# Patient Record
Sex: MALE | Race: WHITE | NOT HISPANIC OR LATINO | URBAN - METROPOLITAN AREA
[De-identification: names, ages, dates, MRNs, and addresses within clinical notes are randomized per-mention and may not be internally consistent; named-entity substitution may affect disease eponyms.]

---

## 2018-11-12 VITALS
RESPIRATION RATE: 15 BRPM | TEMPERATURE: 96.2 F | RESPIRATION RATE: 17 BRPM | SYSTOLIC BLOOD PRESSURE: 90 MMHG | SYSTOLIC BLOOD PRESSURE: 92 MMHG | HEART RATE: 76 BPM | HEART RATE: 66 BPM | DIASTOLIC BLOOD PRESSURE: 58 MMHG | SYSTOLIC BLOOD PRESSURE: 96 MMHG | HEART RATE: 52 BPM | OXYGEN SATURATION: 99 % | OXYGEN SATURATION: 100 % | DIASTOLIC BLOOD PRESSURE: 55 MMHG | RESPIRATION RATE: 18 BRPM | DIASTOLIC BLOOD PRESSURE: 70 MMHG | SYSTOLIC BLOOD PRESSURE: 121 MMHG | HEART RATE: 67 BPM | HEART RATE: 65 BPM | SYSTOLIC BLOOD PRESSURE: 119 MMHG | HEART RATE: 63 BPM | SYSTOLIC BLOOD PRESSURE: 93 MMHG | SYSTOLIC BLOOD PRESSURE: 110 MMHG | HEART RATE: 69 BPM | HEART RATE: 56 BPM | HEART RATE: 58 BPM | RESPIRATION RATE: 19 BRPM | TEMPERATURE: 96.9 F | DIASTOLIC BLOOD PRESSURE: 59 MMHG | HEART RATE: 74 BPM | DIASTOLIC BLOOD PRESSURE: 56 MMHG | DIASTOLIC BLOOD PRESSURE: 67 MMHG | SYSTOLIC BLOOD PRESSURE: 113 MMHG | DIASTOLIC BLOOD PRESSURE: 61 MMHG | SYSTOLIC BLOOD PRESSURE: 91 MMHG | WEIGHT: 160 LBS | DIASTOLIC BLOOD PRESSURE: 62 MMHG | OXYGEN SATURATION: 98 % | DIASTOLIC BLOOD PRESSURE: 57 MMHG | RESPIRATION RATE: 14 BRPM | DIASTOLIC BLOOD PRESSURE: 66 MMHG | RESPIRATION RATE: 16 BRPM | HEIGHT: 70 IN | SYSTOLIC BLOOD PRESSURE: 85 MMHG | HEART RATE: 70 BPM | HEART RATE: 73 BPM

## 2018-11-13 ENCOUNTER — AMBULATORY SURGICAL CENTER (AMBULATORY)
Dept: URBAN - METROPOLITAN AREA SURGERY 17 | Facility: SURGERY | Age: 68
End: 2018-11-13
Payer: MEDICARE

## 2018-11-13 DIAGNOSIS — K64.8 OTHER HEMORRHOIDS: ICD-10-CM

## 2018-11-13 DIAGNOSIS — Z86.010 PERSONAL HISTORY OF COLONIC POLYPS: ICD-10-CM

## 2018-11-13 DIAGNOSIS — K57.30 DIVERTICULOSIS OF LARGE INTESTINE WITHOUT PERFORATION OR ABS: ICD-10-CM

## 2018-11-13 PROCEDURE — G0105 COLORECTAL SCRN; HI RISK IND: HCPCS | Performed by: INTERNAL MEDICINE

## 2020-10-22 PROBLEM — Z00.00 WELLNESS EXAMINATION: Status: ACTIVE | Noted: 2020-10-22

## 2020-10-22 PROBLEM — N52.8 OTHER MALE ERECTILE DYSFUNCTION: Status: ACTIVE | Noted: 2020-10-22

## 2020-11-10 ENCOUNTER — OFFICE VISIT (OUTPATIENT)
Dept: INTERNAL MEDICINE | Facility: CLINIC | Age: 70
End: 2020-11-10

## 2020-11-10 VITALS
DIASTOLIC BLOOD PRESSURE: 78 MMHG | BODY MASS INDEX: 23.34 KG/M2 | SYSTOLIC BLOOD PRESSURE: 122 MMHG | HEIGHT: 70 IN | WEIGHT: 163 LBS

## 2020-11-10 DIAGNOSIS — E55.9 VITAMIN D DEFICIENCY: ICD-10-CM

## 2020-11-10 DIAGNOSIS — N52.8 OTHER MALE ERECTILE DYSFUNCTION: ICD-10-CM

## 2020-11-10 DIAGNOSIS — Z00.00 WELLNESS EXAMINATION: Primary | ICD-10-CM

## 2020-11-10 PROCEDURE — G0439 PPPS, SUBSEQ VISIT: HCPCS | Performed by: INTERNAL MEDICINE

## 2020-11-10 RX ORDER — TADALAFIL 5 MG/1
TABLET ORAL
COMMUNITY
Start: 2015-09-22 | End: 2020-11-10 | Stop reason: SDUPTHER

## 2020-11-10 RX ORDER — ASPIRIN 81 MG/1
TABLET, CHEWABLE ORAL
COMMUNITY
Start: 2013-07-24 | End: 2020-12-11 | Stop reason: SDUPTHER

## 2020-11-10 RX ORDER — TADALAFIL 20 MG/1
20 TABLET ORAL DAILY PRN
Qty: 14 TABLET | Refills: 11 | Status: SHIPPED | OUTPATIENT
Start: 2020-11-10 | End: 2020-12-11 | Stop reason: SDUPTHER

## 2020-11-10 RX ORDER — MAGNESIUM CARB/ALUMINUM HYDROX 105-160MG
TABLET,CHEWABLE ORAL
COMMUNITY
Start: 2013-07-24

## 2020-11-19 ENCOUNTER — TELEPHONE (OUTPATIENT)
Dept: INTERNAL MEDICINE | Facility: CLINIC | Age: 70
End: 2020-11-19

## 2020-11-19 NOTE — TELEPHONE ENCOUNTER
PTS WIFE ROMULO IS CALLING IN STATING THAT PTS LYMPH NODES HAVE SWOLLEN REALLY BAD.  ROMULO SAYS THIS HAS BEEN GOING ON FOR 5 DAYS NOW.  PT IS NOT IN ANY PAIN WITH IT AT ALL BUT ROMULO SAID IT HAS GOTTEN BIGGER AND SHE WANTS TO KNOW WHAT DR VASQUEZ SUGGEST HE DO    PT CALL BACK  [phone]  PHARMACY  [facility]  [address]

## 2020-12-11 ENCOUNTER — OFFICE VISIT (OUTPATIENT)
Dept: INTERNAL MEDICINE | Facility: CLINIC | Age: 70
End: 2020-12-11

## 2020-12-11 VITALS
HEIGHT: 70 IN | WEIGHT: 162 LBS | DIASTOLIC BLOOD PRESSURE: 60 MMHG | HEART RATE: 56 BPM | OXYGEN SATURATION: 96 % | SYSTOLIC BLOOD PRESSURE: 110 MMHG | TEMPERATURE: 97.8 F | BODY MASS INDEX: 23.19 KG/M2

## 2020-12-11 DIAGNOSIS — R22.1 NECK MASS: Primary | ICD-10-CM

## 2020-12-11 DIAGNOSIS — K11.8 SUBMANDIBULAR GLAND MASS: ICD-10-CM

## 2020-12-11 PROCEDURE — 99213 OFFICE O/P EST LOW 20 MIN: CPT | Performed by: INTERNAL MEDICINE

## 2020-12-11 RX ORDER — TADALAFIL 20 MG/1
20 TABLET ORAL
COMMUNITY
Start: 2020-11-10 | End: 2021-07-09 | Stop reason: ALTCHOICE

## 2020-12-11 NOTE — PROGRESS NOTES
Subjective   Matthew Serrano is a 70 y.o. male.     Chief Complaint   Patient presents with   • Swollen Glands     neck area x 1 month       History of Present Illness   This is a 70-year-old gentleman with a past medical history significant for erectile dysfunction who presents for an acute visit.  The patient presents with complaints of a swollen gland in his neck which is been present for approximately 1 month.  He was previously seen at the [facility] where he was treated with amoxicillin.  He states that the lesion is nontender and has not created any difficulties with swallowing or breathing.  He has not had any fevers, no chills, no night sweats, no weight loss or weight changes.  No other new voiced complaints.    The following portions of the patient's history were reviewed and updated as appropriate: allergies, current medications, past family history, past medical history, past social history, past surgical history and problem list.    Depression Screen:  PHQ-2/PHQ-9 Depression Screening 11/10/2020   Little interest or pleasure in doing things 0   Feeling down, depressed, or hopeless 0   Total Score 0       Assessment/Plan   Diagnoses and all orders for this visit:    1. Neck mass (Primary)  -     CT Soft Tissue Neck With Contrast; Future    2. Submandibular gland mass         #1.  Submandibular gland mass-this could be an enlarged lymph gland, it is nontender on examination, no response to treatment with antibiotics, no other associated symptoms, concern for a primary cancer or lymphoma, would recommend obtaining a CT of the soft tissues of the neck for further evaluation.    History reviewed. No pertinent past medical history.    History reviewed. No pertinent surgical history.    History reviewed. No pertinent family history.    Social History     Socioeconomic History   • Marital status:      Spouse name: Not on file   • Number of children: Not on file   • Years of education: Not on  "file   • Highest education level: Not on file   Tobacco Use   • Smoking status: Never Smoker   • Smokeless tobacco: Never Used       Current Outpatient Medications   Medication Sig Dispense Refill   • Glucosamine-Chondroitin 750-600 MG tablet Take  by mouth.     • Multiple Vitamins-Minerals (MULTI COMPLETE PO) Take  by mouth.     • tadalafil (CIALIS) 20 MG tablet Take 20 mg by mouth.       No current facility-administered medications for this visit.        Review of Systems   HENT: Positive for swollen glands.    Neurological: Positive for memory problem.   Psychiatric/Behavioral: Positive for sleep disturbance.   All other systems reviewed and are negative.      Objective   /60   Pulse 56   Temp 97.8 °F (36.6 °C)   Ht 177.8 cm (70\")   Wt 73.5 kg (162 lb)   SpO2 96%   BMI 23.24 kg/m²     Physical Exam  Constitutional:  Patient appears well-developed, well-nourished and in no acute distress.  Neck:  The neck is supple and symmetric.  The trachea is midline with no masses.  Exam of the thyroid reveals no thyromegaly or masses, patient does have a palpable mass just beneath the right mandible which is firm and nontender to palpation  Pulmonary:  Respiratory effort is normal with no increased work of breathing or respiratory distress.  Lungs are clear to auscultation bilaterally.  Cardiovascular:  Auscultation of the heart rate and rhythm is normal.  S1 and S2 are normal without rubs, gallops or murmurs.  Carotid pulses are 2+ bilaterally without bruit.  Examination of the extremities reveals no edema.  Neurologic:  Cranial nerves II-XII are grossly intact.  Psychiatric:  Patient's judgment and insight are unimpaired.  Patient's mood and affect are normal.      Recent Results (from the past 2016 hour(s))   CBC & Differential    Collection Time: 11/03/20  8:59 AM    Specimen: Blood   Result Value Ref Range    WBC 4.8 3.4 - 10.8 x10E3/uL    RBC 4.60 4.14 - 5.80 x10E6/uL    Hemoglobin 15.3 13.0 - 17.7 g/dL    " Hematocrit 44.7 37.5 - 51.0 %    MCV 97 79 - 97 fL    MCH 33.3 (H) 26.6 - 33.0 pg    MCHC 34.2 31.5 - 35.7 g/dL    RDW 11.9 11.6 - 15.4 %    Platelets 222 150 - 450 x10E3/uL    Neutrophil Rel % 60 Not Estab. %    Lymphocyte Rel % 29 Not Estab. %    Monocyte Rel % 8 Not Estab. %    Eosinophil Rel % 2 Not Estab. %    Basophil Rel % 1 Not Estab. %    Neutrophils Absolute 2.9 1.4 - 7.0 x10E3/uL    Lymphocytes Absolute 1.4 0.7 - 3.1 x10E3/uL    Monocytes Absolute 0.4 0.1 - 0.9 x10E3/uL    Eosinophils Absolute 0.1 0.0 - 0.4 x10E3/uL    Basophils Absolute 0.0 0.0 - 0.2 x10E3/uL    Immature Granulocyte Rel % 0 Not Estab. %    Immature Grans Absolute 0.0 0.0 - 0.1 x10E3/uL   Comprehensive metabolic panel    Collection Time: 11/03/20  8:59 AM    Specimen: Blood   Result Value Ref Range    Glucose 91 65 - 99 mg/dL    BUN 15 8 - 27 mg/dL    Creatinine 0.88 0.76 - 1.27 mg/dL    eGFR Non African Am 88 >59 mL/min/1.73    eGFR African Am 102 >59 mL/min/1.73    BUN/Creatinine Ratio 17 10 - 24    Sodium 137 134 - 144 mmol/L    Potassium 5.0 3.5 - 5.2 mmol/L    Chloride 102 96 - 106 mmol/L    Total CO2 25 20 - 29 mmol/L    Calcium 9.4 8.6 - 10.2 mg/dL    Total Protein 6.5 6.0 - 8.5 g/dL    Albumin 4.1 3.8 - 4.8 g/dL    Globulin 2.4 1.5 - 4.5 g/dL    A/G Ratio 1.7 1.2 - 2.2    Total Bilirubin 0.7 0.0 - 1.2 mg/dL    Alkaline Phosphatase 55 39 - 117 IU/L    AST (SGOT) 23 0 - 40 IU/L    ALT (SGPT) 20 0 - 44 IU/L   Lipid Panel With LDL / HDL Ratio    Collection Time: 11/03/20  8:59 AM    Specimen: Blood   Result Value Ref Range    Total Cholesterol 157 100 - 199 mg/dL    Triglycerides 83 0 - 149 mg/dL    HDL Cholesterol 58 >39 mg/dL    VLDL Cholesterol Yoel 16 5 - 40 mg/dL    LDL Chol Calc ([facility]) 83 0 - 99 mg/dL    LDL/HDL RATIO 1.4 0.0 - 3.6 ratio   Vitamin D 25 hydroxy    Collection Time: 11/03/20  8:59 AM    Specimen: Blood   Result Value Ref Range    25 Hydroxy, Vitamin D 49.2 30.0 - 100.0 ng/mL

## 2020-12-21 ENCOUNTER — HOSPITAL ENCOUNTER (OUTPATIENT)
Dept: CT IMAGING | Facility: HOSPITAL | Age: 70
Discharge: HOME | End: 2020-12-21
Admitting: NURSE PRACTITIONER

## 2020-12-21 DIAGNOSIS — R22.1 NECK MASS: ICD-10-CM

## 2020-12-21 LAB — CREAT BLDA-MCNC: 1 MG/DL (ref 0.6–1.3)

## 2020-12-21 PROCEDURE — 82565 ASSAY OF CREATININE: CPT

## 2020-12-21 PROCEDURE — 25010000002 IOPAMIDOL 61 % SOLUTION: Performed by: NURSE PRACTITIONER

## 2020-12-21 PROCEDURE — 70491 CT SOFT TISSUE NECK W/DYE: CPT

## 2020-12-21 RX ADMIN — IOPAMIDOL 85 ML: 612 INJECTION, SOLUTION INTRAVENOUS at 20:00

## 2020-12-22 PROCEDURE — 25010000002 IOPAMIDOL 61 % SOLUTION: Performed by: NURSE PRACTITIONER

## 2020-12-23 ENCOUNTER — OFFICE VISIT (OUTPATIENT)
Dept: FAMILY MEDICINE CLINIC | Facility: CLINIC | Age: 70
End: 2020-12-23

## 2020-12-23 VITALS
SYSTOLIC BLOOD PRESSURE: 110 MMHG | BODY MASS INDEX: 22.94 KG/M2 | HEART RATE: 48 BPM | OXYGEN SATURATION: 98 % | DIASTOLIC BLOOD PRESSURE: 54 MMHG | RESPIRATION RATE: 16 BRPM | TEMPERATURE: 97.1 F | WEIGHT: 160.2 LBS | HEIGHT: 70 IN

## 2020-12-23 DIAGNOSIS — R60.9 SUBMANDIBULAR GLAND SWELLING: Primary | ICD-10-CM

## 2020-12-23 DIAGNOSIS — Z82.49 FAMILY HISTORY OF HEART VALVE ABNORMALITY: ICD-10-CM

## 2020-12-23 PROCEDURE — 99214 OFFICE O/P EST MOD 30 MIN: CPT | Performed by: INTERNAL MEDICINE
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
## 2021-01-21 ENCOUNTER — OFFICE VISIT (OUTPATIENT)
Dept: CARDIOLOGY | Facility: CLINIC | Age: 71
End: 2021-01-21

## 2021-01-21 VITALS
SYSTOLIC BLOOD PRESSURE: 110 MMHG | BODY MASS INDEX: 23.39 KG/M2 | HEART RATE: 51 BPM | HEIGHT: 70 IN | DIASTOLIC BLOOD PRESSURE: 64 MMHG | WEIGHT: 163.4 LBS

## 2021-01-21 DIAGNOSIS — Z82.49 FAMILY HISTORY OF VALVULAR HEART DISEASE: ICD-10-CM

## 2021-01-21 DIAGNOSIS — R00.1 BRADYCARDIA, SINUS: ICD-10-CM

## 2021-01-21 DIAGNOSIS — Z82.49 FAMILY HISTORY OF CORONARY ARTERY DISEASE: ICD-10-CM

## 2021-01-21 DIAGNOSIS — R06.02 SHORTNESS OF BREATH: Primary | ICD-10-CM

## 2021-01-21 PROCEDURE — 99204 OFFICE O/P NEW MOD 45 MIN: CPT | Performed by: INTERNAL MEDICINE

## 2021-01-21 PROCEDURE — 93000 ELECTROCARDIOGRAM COMPLETE: CPT | Performed by: INTERNAL MEDICINE

## 2021-01-21 NOTE — PROGRESS NOTES
Date of Office Visit: 2021  Encounter Provider: Malik Tena MD  Place of Service: [facility] CARDIOLOGY  Patient Name: Matthew Serrano  :1950    Chief complaint: Family history of valvular heart disease and coronary artery disease.    History of Present Illness:    I had the pleasure of seeing the patient in cardiology office on 2021.  He is a very pleasant 70 year-old fairly healthy male who presents for evaluation.  The patient is relatively healthy.  He is active, and walks 5 miles most days.  He has some mild dyspnea on exertion, but no chest discomfort.  He has also had longstanding sinus bradycardia in the 50s, although he is completely asymptomatic from this as well.  However, he does have a significant family history of cardiac disease.  Specifically, his mother is  from a stroke following a heart valve surgery.  He also has 2 brothers with cardiac issues.  One of his brothers had a valve replaced in his early 60s.  His other brother had lymphoma, and did receive radiation therapy to his chest.  However, he ultimately ended up with 2 valve replacements, repaired valve, and a three-vessel CABG at age 54.  His father also had a history of congestive heart failure in his 80s.  He is mainly here to be checked as he has not had any specific cardiac testing other than an EKG in the past.      Past Medical History:   Diagnosis Date   • Erectile dysfunction    • Family history of heart valve abnormality    • Sinus bradycardia        History reviewed. No pertinent surgical history.    Current Outpatient Medications on File Prior to Visit   Medication Sig Dispense Refill   • Glucosamine-Chondroitin 750-600 MG tablet Take  by mouth.     • Multiple Vitamins-Minerals (MULTI COMPLETE PO) Take  by mouth.     • tadalafil (CIALIS) 20 MG tablet Take 20 mg by mouth.       No current facility-administered medications on file prior to visit.      Allergies as of  "2021   • (No Known Allergies)     Social History     Socioeconomic History   • Marital status:      Spouse name: Not on file   • Number of children: Not on file   • Years of education: Not on file   • Highest education level: Not on file   Tobacco Use   • Smoking status: Never Smoker   • Smokeless tobacco: Never Used   Substance and Sexual Activity   • Alcohol use: Yes     Comment: nightly   • Drug use: Never   Social History Narrative    ;  No kids.  Has a step daughter.  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       Family History   Problem Relation Age of Onset   • Stroke Mother    • Heart valve disorder Mother         Mother  from a stroke after a heart valve surgery   • Heart disease Father    • Heart valve disorder Maternal Aunt    • Lymphoma Brother    • Heart valve disorder Brother         Status post replacement of 2 valves, repair of 1 valve, and 3 vessel CABG at 54   • Heart valve disorder Brother         Valve replacement surgery in his early 60s       Review of Systems   Respiratory: Positive for shortness of breath.    All other systems reviewed and are negative.     Objective:     Vitals:    21 0908   BP: 110/64   Pulse: 51   Weight: 74.1 kg (163 lb 6.4 oz)   Height: 177.8 cm (70\")     Body mass index is 23.45 kg/m².    Constitutional:       Appearance: Healthy appearance. Well-developed.   Eyes:      Conjunctiva/sclera: Conjunctivae normal.   HENT:      Head: Normocephalic and atraumatic.   Neck:      Musculoskeletal: Neck supple.   Pulmonary:      Effort: Pulmonary effort is normal.      Breath sounds: Normal breath sounds.   Cardiovascular:      Bradycardia present. Regular rhythm.      No gallop.   Edema:     Peripheral edema absent.   Abdominal:      Palpations: Abdomen is soft.      Tenderness: There is no abdominal tenderness.   Skin:     General: Skin is warm.   Neurological:      Mental Status: Alert and oriented to " person, place, and time.   Psychiatric:         Behavior: Behavior normal.       Lab Review:     ECG 12 Lead    Date/Time: 1/21/2021 9:17 AM  Performed by: Malik Tena MD  Authorized by: Malik Tena MD   Comparison: compared with previous ECG from 9/22/2015  Similar to previous ECG  Rhythm: sinus bradycardia  Rate: bradycardic  BPM: 51    Clinical impression: normal ECG            Lipid Panel    Lipid Panel 11/3/20   Total Cholesterol 157   Triglycerides 83   HDL Cholesterol 58   VLDL Cholesterol 16   LDL Cholesterol  83   LDL/HDL Ratio 1.4      Comments are available for some flowsheets but are not being displayed.                Assessment:       Diagnosis Plan   1. Shortness of breath  Adult Transthoracic Echo Complete w/ Color, Spectral and Contrast if Necessary Per Protocol    Treadmill Stress Test   2. Family history of valvular heart disease  Adult Transthoracic Echo Complete w/ Color, Spectral and Contrast if Necessary Per Protocol   3. Family history of coronary artery disease  Adult Transthoracic Echo Complete w/ Color, Spectral and Contrast if Necessary Per Protocol    Treadmill Stress Test   4. Bradycardia, sinus  Adult Transthoracic Echo Complete w/ Color, Spectral and Contrast if Necessary Per Protocol     Plan:       I did not hear a murmur on exam, although his family history of valvular heart disease is extensive as detailed above.  He is not sure of the specific type of valve disease, although a bicuspid aortic valve or mitral valve prolapse would be the most common things.  I have recommended an echocardiogram at this point to definitively assess his cardiac valves.  His brother also had a three-vessel CABG, although it was unclear as to whether this was related to radiation-induced heart disease.  I have also ordered an exercise treadmill stress test.  Further plans will be made pending the results of the above tests.

## 2021-03-08 ENCOUNTER — HOSPITAL ENCOUNTER (OUTPATIENT)
Dept: CARDIOLOGY | Facility: HOSPITAL | Age: 71
Discharge: HOME | End: 2021-03-08

## 2021-03-08 VITALS
WEIGHT: 163 LBS | SYSTOLIC BLOOD PRESSURE: 123 MMHG | HEART RATE: 49 BPM | HEIGHT: 70 IN | DIASTOLIC BLOOD PRESSURE: 78 MMHG | BODY MASS INDEX: 23.34 KG/M2

## 2021-03-08 DIAGNOSIS — Z82.49 FAMILY HISTORY OF CORONARY ARTERY DISEASE: ICD-10-CM

## 2021-03-08 DIAGNOSIS — R00.1 BRADYCARDIA, SINUS: ICD-10-CM

## 2021-03-08 DIAGNOSIS — Z82.49 FAMILY HISTORY OF VALVULAR HEART DISEASE: ICD-10-CM

## 2021-03-08 DIAGNOSIS — R06.02 SHORTNESS OF BREATH: ICD-10-CM

## 2021-03-08 LAB
BH CV STRESS BP STAGE 1: NORMAL
BH CV STRESS BP STAGE 2: NORMAL
BH CV STRESS BP STAGE 3: NORMAL
BH CV STRESS DURATION MIN STAGE 1: 3
BH CV STRESS DURATION MIN STAGE 2: 3
BH CV STRESS DURATION MIN STAGE 3: 3
BH CV STRESS DURATION SEC STAGE 1: 0
BH CV STRESS DURATION SEC STAGE 2: 0
BH CV STRESS DURATION SEC STAGE 3: 0
BH CV STRESS GRADE STAGE 1: 10
BH CV STRESS GRADE STAGE 2: 12
BH CV STRESS GRADE STAGE 3: 14
BH CV STRESS HR STAGE 1: 88
BH CV STRESS HR STAGE 2: 113
BH CV STRESS HR STAGE 3: 140
BH CV STRESS METS STAGE 1: 5
BH CV STRESS METS STAGE 2: 7.5
BH CV STRESS METS STAGE 3: 10
BH CV STRESS PROTOCOL 1: NORMAL
BH CV STRESS RECOVERY BP: NORMAL MMHG
BH CV STRESS RECOVERY HR: 70 BPM
BH CV STRESS SPEED STAGE 1: 1.7
BH CV STRESS SPEED STAGE 2: 2.5
BH CV STRESS SPEED STAGE 3: 3.4
BH CV STRESS STAGE 1: 1
BH CV STRESS STAGE 2: 2
BH CV STRESS STAGE 3: 3
MAXIMAL PREDICTED HEART RATE: 152 BPM
PERCENT MAX PREDICTED HR: 92.11 %
STRESS BASELINE BP: NORMAL MMHG
STRESS BASELINE HR: 51 BPM
STRESS PERCENT HR: 108 %
STRESS POST ESTIMATED WORKLOAD: 10 METS
STRESS POST EXERCISE DUR MIN: 9 MIN
STRESS POST EXERCISE DUR SEC: 0 SEC
STRESS POST PEAK BP: NORMAL MMHG
STRESS POST PEAK HR: 140 BPM
STRESS TARGET HR: 129 BPM

## 2021-03-08 PROCEDURE — 93018 CV STRESS TEST I&R ONLY: CPT | Performed by: INTERNAL MEDICINE

## 2021-03-08 PROCEDURE — 93306 TTE W/DOPPLER COMPLETE: CPT

## 2021-03-08 PROCEDURE — 93306 TTE W/DOPPLER COMPLETE: CPT | Performed by: INTERNAL MEDICINE

## 2021-03-08 PROCEDURE — 93016 CV STRESS TEST SUPVJ ONLY: CPT | Performed by: INTERNAL MEDICINE

## 2021-03-08 PROCEDURE — 93017 CV STRESS TEST TRACING ONLY: CPT

## 2021-03-09 LAB
AORTIC ARCH: 2.2 CM
ASCENDING AORTA: 3.3 CM
BH CV ECHO MEAS - ACS: 2.2 CM
BH CV ECHO MEAS - AI DEC SLOPE: 70.5 CM/SEC^2
BH CV ECHO MEAS - AI MAX PG: 15.8 MMHG
BH CV ECHO MEAS - AI MAX VEL: 199 CM/SEC
BH CV ECHO MEAS - AI P1/2T: 826.7 MSEC
BH CV ECHO MEAS - AO MAX PG (FULL): 0.99 MMHG
BH CV ECHO MEAS - AO MAX PG: 4 MMHG
BH CV ECHO MEAS - AO MEAN PG (FULL): 1 MMHG
BH CV ECHO MEAS - AO MEAN PG: 2 MMHG
BH CV ECHO MEAS - AO ROOT AREA (BSA CORRECTED): 1.8
BH CV ECHO MEAS - AO ROOT AREA: 9.1 CM^2
BH CV ECHO MEAS - AO ROOT DIAM: 3.4 CM
BH CV ECHO MEAS - AO V2 MAX: 100 CM/SEC
BH CV ECHO MEAS - AO V2 MEAN: 71.8 CM/SEC
BH CV ECHO MEAS - AO V2 VTI: 26.9 CM
BH CV ECHO MEAS - ASC AORTA: 3.3 CM
BH CV ECHO MEAS - AVA(I,A): 2.5 CM^2
BH CV ECHO MEAS - AVA(I,D): 2.5 CM^2
BH CV ECHO MEAS - AVA(V,A): 3 CM^2
BH CV ECHO MEAS - AVA(V,D): 3 CM^2
BH CV ECHO MEAS - BSA(HAYCOCK): 1.9 M^2
BH CV ECHO MEAS - BSA: 1.9 M^2
BH CV ECHO MEAS - BZI_BMI: 23.4 KILOGRAMS/M^2
BH CV ECHO MEAS - BZI_METRIC_HEIGHT: 177.8 CM
BH CV ECHO MEAS - BZI_METRIC_WEIGHT: 73.9 KG
BH CV ECHO MEAS - EDV(MOD-SP2): 58 ML
BH CV ECHO MEAS - EDV(MOD-SP4): 73 ML
BH CV ECHO MEAS - EDV(TEICH): 78.6 ML
BH CV ECHO MEAS - EF(CUBED): 85.6 %
BH CV ECHO MEAS - EF(MOD-BP): 68.6 %
BH CV ECHO MEAS - EF(MOD-SP2): 67.2 %
BH CV ECHO MEAS - EF(MOD-SP4): 69.9 %
BH CV ECHO MEAS - EF(TEICH): 79.4 %
BH CV ECHO MEAS - ESV(MOD-SP2): 19 ML
BH CV ECHO MEAS - ESV(MOD-SP4): 22 ML
BH CV ECHO MEAS - ESV(TEICH): 16.2 ML
BH CV ECHO MEAS - FS: 47.6 %
BH CV ECHO MEAS - IVS/LVPW: 1.2
BH CV ECHO MEAS - IVSD: 1.1 CM
BH CV ECHO MEAS - LAT PEAK E' VEL: 11 CM/SEC
BH CV ECHO MEAS - LV DIASTOLIC VOL/BSA (35-75): 38.1 ML/M^2
BH CV ECHO MEAS - LV MASS(C)D: 137.2 GRAMS
BH CV ECHO MEAS - LV MASS(C)DI: 71.7 GRAMS/M^2
BH CV ECHO MEAS - LV MAX PG: 3 MMHG
BH CV ECHO MEAS - LV MEAN PG: 1 MMHG
BH CV ECHO MEAS - LV SYSTOLIC VOL/BSA (12-30): 11.5 ML/M^2
BH CV ECHO MEAS - LV V1 MAX: 86.8 CM/SEC
BH CV ECHO MEAS - LV V1 MEAN: 56.2 CM/SEC
BH CV ECHO MEAS - LV V1 VTI: 19.7 CM
BH CV ECHO MEAS - LVIDD: 4.2 CM
BH CV ECHO MEAS - LVIDS: 2.2 CM
BH CV ECHO MEAS - LVLD AP2: 7.6 CM
BH CV ECHO MEAS - LVLD AP4: 7.7 CM
BH CV ECHO MEAS - LVLS AP2: 7 CM
BH CV ECHO MEAS - LVLS AP4: 6.6 CM
BH CV ECHO MEAS - LVOT AREA (M): 3.5 CM^2
BH CV ECHO MEAS - LVOT AREA: 3.5 CM^2
BH CV ECHO MEAS - LVOT DIAM: 2.1 CM
BH CV ECHO MEAS - LVPWD: 0.9 CM
BH CV ECHO MEAS - MED PEAK E' VEL: 8.5 CM/SEC
BH CV ECHO MEAS - MR MAX PG: 134.6 MMHG
BH CV ECHO MEAS - MR MAX VEL: 580 CM/SEC
BH CV ECHO MEAS - MV A MAX VEL: 63.8 CM/SEC
BH CV ECHO MEAS - MV DEC SLOPE: 230 CM/SEC^2
BH CV ECHO MEAS - MV DEC TIME: 0.24 SEC
BH CV ECHO MEAS - MV E MAX VEL: 66.8 CM/SEC
BH CV ECHO MEAS - MV E/A: 1
BH CV ECHO MEAS - MV MAX PG: 2.6 MMHG
BH CV ECHO MEAS - MV MEAN PG: 1 MMHG
BH CV ECHO MEAS - MV P1/2T MAX VEL: 44.6 CM/SEC
BH CV ECHO MEAS - MV P1/2T: 56.8 MSEC
BH CV ECHO MEAS - MV V2 MAX: 80.8 CM/SEC
BH CV ECHO MEAS - MV V2 MEAN: 37 CM/SEC
BH CV ECHO MEAS - MV V2 VTI: 30.6 CM
BH CV ECHO MEAS - MVA P1/2T LCG: 4.9 CM^2
BH CV ECHO MEAS - MVA(P1/2T): 3.9 CM^2
BH CV ECHO MEAS - MVA(VTI): 2.2 CM^2
BH CV ECHO MEAS - PA ACC TIME: 0.14 SEC
BH CV ECHO MEAS - PA MAX PG (FULL): 1.5 MMHG
BH CV ECHO MEAS - PA MAX PG: 3 MMHG
BH CV ECHO MEAS - PA PR(ACCEL): 16 MMHG
BH CV ECHO MEAS - PA V2 MAX: 86.9 CM/SEC
BH CV ECHO MEAS - PULM A REVS DUR: 0.14 SEC
BH CV ECHO MEAS - PULM A REVS VEL: 23.6 CM/SEC
BH CV ECHO MEAS - PULM DIAS VEL: 31.3 CM/SEC
BH CV ECHO MEAS - PULM S/D: 1.4
BH CV ECHO MEAS - PULM SYS VEL: 43.3 CM/SEC
BH CV ECHO MEAS - PVA(V,A): 2.4 CM^2
BH CV ECHO MEAS - PVA(V,D): 2.4 CM^2
BH CV ECHO MEAS - QP/QS: 0.73
BH CV ECHO MEAS - RAP SYSTOLE: 8 MMHG
BH CV ECHO MEAS - RV MAX PG: 1.5 MMHG
BH CV ECHO MEAS - RV MEAN PG: 1 MMHG
BH CV ECHO MEAS - RV V1 MAX: 61.4 CM/SEC
BH CV ECHO MEAS - RV V1 MEAN: 45 CM/SEC
BH CV ECHO MEAS - RV V1 VTI: 14.4 CM
BH CV ECHO MEAS - RVOT AREA: 3.5 CM^2
BH CV ECHO MEAS - RVOT DIAM: 2.1 CM
BH CV ECHO MEAS - RVSP: 33 MMHG
BH CV ECHO MEAS - SI(AO): 127.6 ML/M^2
BH CV ECHO MEAS - SI(CUBED): 33.2 ML/M^2
BH CV ECHO MEAS - SI(LVOT): 35.7 ML/M^2
BH CV ECHO MEAS - SI(MOD-SP2): 20.4 ML/M^2
BH CV ECHO MEAS - SI(MOD-SP4): 26.7 ML/M^2
BH CV ECHO MEAS - SI(TEICH): 32.6 ML/M^2
BH CV ECHO MEAS - SUP REN AO DIAM: 1.9 CM
BH CV ECHO MEAS - SV(AO): 244.2 ML
BH CV ECHO MEAS - SV(CUBED): 63.4 ML
BH CV ECHO MEAS - SV(LVOT): 68.2 ML
BH CV ECHO MEAS - SV(MOD-SP2): 39 ML
BH CV ECHO MEAS - SV(MOD-SP4): 51 ML
BH CV ECHO MEAS - SV(RVOT): 49.9 ML
BH CV ECHO MEAS - SV(TEICH): 62.4 ML
BH CV ECHO MEAS - TAPSE (>1.6): 2.4 CM
BH CV ECHO MEAS - TR MAX VEL: 251 CM/SEC
BH CV ECHO MEASUREMENTS AVERAGE E/E' RATIO: 6.85
BH CV XLRA - RV BASE: 2.9 CM
BH CV XLRA - RV LENGTH: 5.8 CM
BH CV XLRA - RV MID: 2.4 CM
BH CV XLRA - TDI S': 12.9 CM/SEC
LEFT ATRIUM VOLUME INDEX: 22 ML/M2
MAXIMAL PREDICTED HEART RATE: 152 BPM
SINUS: 3.3 CM
STJ: 2.9 CM
STRESS TARGET HR: 129 BPM

## 2021-03-11 NOTE — PROGRESS NOTES
Called and gave him the results.  His treadmill was normal.  He is not having chest pain.    ADRIANE

## 2021-03-19 ENCOUNTER — BULK ORDERING (OUTPATIENT)
Dept: CASE MANAGEMENT | Facility: OTHER | Age: 71
End: 2021-03-19

## 2021-03-19 DIAGNOSIS — Z23 IMMUNIZATION DUE: ICD-10-CM

## 2021-07-06 ENCOUNTER — TELEPHONE (OUTPATIENT)
Dept: FAMILY MEDICINE CLINIC | Facility: CLINIC | Age: 71
End: 2021-07-06

## 2021-07-06 NOTE — TELEPHONE ENCOUNTER
Caller: Matthew Serrano    Relationship: Self    Best call back number: [phone]    What medication are you requesting: SILDENAFIL 20 MG    What are your current symptoms:     How long have you been experiencing symptoms:    Have you had these symptoms before:    [] Yes  [] No    Have you been treated for these symptoms before:   [] Yes  [] No    If a prescription is needed, what is your preferred pharmacy and phone number:    [facility] [id number] [facility] - [address] [facility] AT [address] - [phone] [facility] [phone]   [phone]    Additional notes: PATIENT STATES HE HAS A PRESCRIPTION FOR SILDENAFIL AND WOULD LIKE FOR DR FERNANDEZ TO TAKE OVER IT. STATES HE IS OUT AS OF 7/5/2021

## 2021-07-07 NOTE — TELEPHONE ENCOUNTER
Does he see urologist for ED or prostate health?  If so, that is who usually prescribes selindafil.  If he doesn't see urologist, he needs a yearly PSA screening and have them send me refill request

## 2021-07-08 DIAGNOSIS — Z12.5 SCREENING PSA (PROSTATE SPECIFIC ANTIGEN): Primary | ICD-10-CM

## 2021-07-09 RX ORDER — SILDENAFIL CITRATE 20 MG/1
TABLET ORAL
Qty: 60 TABLET | Refills: 1 | Status: SHIPPED | OUTPATIENT
Start: 2021-07-09 | End: 2021-11-17 | Stop reason: SDUPTHER

## 2021-07-09 NOTE — TELEPHONE ENCOUNTER
Refill sent to [facility].  Cialis removed from his med list as he can't take sildenafil along with cialis

## 2021-08-13 DIAGNOSIS — Z13.29 SCREENING FOR THYROID DISORDER: ICD-10-CM

## 2021-08-13 DIAGNOSIS — Z79.899 MEDICATION MANAGEMENT: ICD-10-CM

## 2021-08-13 DIAGNOSIS — Z13.220 SCREENING FOR LIPID DISORDERS: Primary | ICD-10-CM

## 2021-08-14 LAB
ALBUMIN SERPL-MCNC: 4.1 G/DL (ref 3.5–5.2)
ALBUMIN/GLOB SERPL: 1.6 G/DL
ALP SERPL-CCNC: 56 U/L (ref 39–117)
ALT SERPL-CCNC: 20 U/L (ref 1–41)
AST SERPL-CCNC: 21 U/L (ref 1–40)
BASOPHILS # BLD AUTO: 0.04 10*3/MM3 (ref 0–0.2)
BASOPHILS NFR BLD AUTO: 0.8 % (ref 0–1.5)
BILIRUB SERPL-MCNC: 0.5 MG/DL (ref 0–1.2)
BUN SERPL-MCNC: 15 MG/DL (ref 8–23)
BUN/CREAT SERPL: 15.3 (ref 7–25)
CALCIUM SERPL-MCNC: 9.6 MG/DL (ref 8.6–10.5)
CHLORIDE SERPL-SCNC: 102 MMOL/L (ref 98–107)
CHOLEST SERPL-MCNC: 139 MG/DL (ref 0–200)
CO2 SERPL-SCNC: 28.1 MMOL/L (ref 22–29)
CREAT SERPL-MCNC: 0.98 MG/DL (ref 0.76–1.27)
EOSINOPHIL # BLD AUTO: 0.19 10*3/MM3 (ref 0–0.4)
EOSINOPHIL NFR BLD AUTO: 3.6 % (ref 0.3–6.2)
ERYTHROCYTE [DISTWIDTH] IN BLOOD BY AUTOMATED COUNT: 11.8 % (ref 12.3–15.4)
GLOBULIN SER CALC-MCNC: 2.5 GM/DL
GLUCOSE SERPL-MCNC: 88 MG/DL (ref 65–99)
HCT VFR BLD AUTO: 42.7 % (ref 37.5–51)
HDLC SERPL-MCNC: 59 MG/DL (ref 40–60)
HGB BLD-MCNC: 14.2 G/DL (ref 13–17.7)
IMM GRANULOCYTES # BLD AUTO: 0.01 10*3/MM3 (ref 0–0.05)
IMM GRANULOCYTES NFR BLD AUTO: 0.2 % (ref 0–0.5)
LDLC SERPL CALC-MCNC: 68 MG/DL (ref 0–100)
LDLC/HDLC SERPL: 1.16 {RATIO}
LYMPHOCYTES # BLD AUTO: 1.41 10*3/MM3 (ref 0.7–3.1)
LYMPHOCYTES NFR BLD AUTO: 26.7 % (ref 19.6–45.3)
MCH RBC QN AUTO: 33 PG (ref 26.6–33)
MCHC RBC AUTO-ENTMCNC: 33.3 G/DL (ref 31.5–35.7)
MCV RBC AUTO: 99.3 FL (ref 79–97)
MONOCYTES # BLD AUTO: 0.35 10*3/MM3 (ref 0.1–0.9)
MONOCYTES NFR BLD AUTO: 6.6 % (ref 5–12)
NEUTROPHILS # BLD AUTO: 3.29 10*3/MM3 (ref 1.7–7)
NEUTROPHILS NFR BLD AUTO: 62.1 % (ref 42.7–76)
NRBC BLD AUTO-RTO: 0.2 /100 WBC (ref 0–0.2)
PLATELET # BLD AUTO: 226 10*3/MM3 (ref 140–450)
POTASSIUM SERPL-SCNC: 4.8 MMOL/L (ref 3.5–5.2)
PROT SERPL-MCNC: 6.6 G/DL (ref 6–8.5)
RBC # BLD AUTO: 4.3 10*6/MM3 (ref 4.14–5.8)
SODIUM SERPL-SCNC: 135 MMOL/L (ref 136–145)
TRIGL SERPL-MCNC: 59 MG/DL (ref 0–150)
TSH SERPL DL<=0.005 MIU/L-ACNC: 2.12 UIU/ML (ref 0.27–4.2)
VLDLC SERPL CALC-MCNC: 12 MG/DL (ref 5–40)
WBC # BLD AUTO: 5.29 10*3/MM3 (ref 3.4–10.8)

## 2021-08-28 ENCOUNTER — IMMUNIZATION (OUTPATIENT)
Dept: VACCINE CLINIC | Facility: HOSPITAL | Age: 71
End: 2021-08-28

## 2021-08-28 PROCEDURE — 0001A: CPT | Performed by: INTERNAL MEDICINE

## 2021-08-28 PROCEDURE — 0003A: CPT | Performed by: INTERNAL MEDICINE

## 2021-08-28 PROCEDURE — 91300 HC SARSCOV02 VAC 30MCG/0.3ML IM: CPT | Performed by: INTERNAL MEDICINE

## 2021-11-11 ENCOUNTER — TELEPHONE (OUTPATIENT)
Dept: FAMILY MEDICINE CLINIC | Facility: CLINIC | Age: 71
End: 2021-11-11

## 2021-11-11 DIAGNOSIS — Z79.899 MEDICATION MANAGEMENT: Primary | ICD-10-CM

## 2021-11-11 DIAGNOSIS — R79.89 ABNORMAL CBC: Primary | ICD-10-CM

## 2021-11-11 NOTE — TELEPHONE ENCOUNTER
Caller: Matthew Serrano    Relationship: Self    Best call back number: [phone]     What orders are you requesting (i.e. lab or imaging): LABS    In what timeframe would the patient need to come in: BEFORE [facility] ON 11/17/2021    Where will you receive your lab/imaging services: IN OFFICE    Additional notes: PATIENT HAS NOT HEARD ANYTHING ABOUT COMING IN FOR HIS LABS YET.

## 2021-11-12 ENCOUNTER — LAB (OUTPATIENT)
Dept: LAB | Facility: HOSPITAL | Age: 71
End: 2021-11-12

## 2021-11-12 DIAGNOSIS — Z12.5 SCREENING FOR PROSTATE CANCER: Primary | ICD-10-CM

## 2021-11-12 DIAGNOSIS — R79.89 ABNORMAL CBC: ICD-10-CM

## 2021-11-12 DIAGNOSIS — Z12.5 SCREENING FOR PROSTATE CANCER: ICD-10-CM

## 2021-11-12 LAB
BASOPHILS # BLD AUTO: 0.03 10*3/MM3 (ref 0–0.2)
BASOPHILS NFR BLD AUTO: 0.6 % (ref 0–1.5)
DEPRECATED RDW RBC AUTO: 46 FL (ref 37–54)
EOSINOPHIL # BLD AUTO: 0.16 10*3/MM3 (ref 0–0.4)
EOSINOPHIL NFR BLD AUTO: 3.1 % (ref 0.3–6.2)
ERYTHROCYTE [DISTWIDTH] IN BLOOD BY AUTOMATED COUNT: 13 % (ref 12.3–15.4)
FOLATE SERPL-MCNC: >20 NG/ML (ref 4.78–24.2)
HCT VFR BLD AUTO: 45.4 % (ref 37.5–51)
HGB BLD-MCNC: 15.8 G/DL (ref 13–17.7)
IMM GRANULOCYTES # BLD AUTO: 0.01 10*3/MM3 (ref 0–0.05)
IMM GRANULOCYTES NFR BLD AUTO: 0.2 % (ref 0–0.5)
LYMPHOCYTES # BLD AUTO: 1.85 10*3/MM3 (ref 0.7–3.1)
LYMPHOCYTES NFR BLD AUTO: 35.6 % (ref 19.6–45.3)
MCH RBC QN AUTO: 33.1 PG (ref 26.6–33)
MCHC RBC AUTO-ENTMCNC: 34.8 G/DL (ref 31.5–35.7)
MCV RBC AUTO: 95.2 FL (ref 79–97)
MONOCYTES # BLD AUTO: 0.39 10*3/MM3 (ref 0.1–0.9)
MONOCYTES NFR BLD AUTO: 7.5 % (ref 5–12)
NEUTROPHILS NFR BLD AUTO: 2.75 10*3/MM3 (ref 1.7–7)
NEUTROPHILS NFR BLD AUTO: 53 % (ref 42.7–76)
NRBC BLD AUTO-RTO: 0 /100 WBC (ref 0–0.2)
PLATELET # BLD AUTO: 232 10*3/MM3 (ref 140–450)
PMV BLD AUTO: 9 FL (ref 6–12)
RBC # BLD AUTO: 4.77 10*6/MM3 (ref 4.14–5.8)
VIT B12 BLD-MCNC: 470 PG/ML (ref 211–946)
WBC # BLD AUTO: 5.19 10*3/MM3 (ref 3.4–10.8)

## 2021-11-12 PROCEDURE — 85025 COMPLETE CBC W/AUTO DIFF WBC: CPT

## 2021-11-12 PROCEDURE — G0103 PSA SCREENING: HCPCS

## 2021-11-12 PROCEDURE — 82607 VITAMIN B-12: CPT

## 2021-11-12 PROCEDURE — 82746 ASSAY OF FOLIC ACID SERUM: CPT

## 2021-11-12 PROCEDURE — 36415 COLL VENOUS BLD VENIPUNCTURE: CPT

## 2021-11-13 LAB — PSA SERPL-MCNC: 0.8 NG/ML (ref 0–4)

## 2021-11-17 ENCOUNTER — OFFICE VISIT (OUTPATIENT)
Dept: FAMILY MEDICINE CLINIC | Facility: CLINIC | Age: 71
End: 2021-11-17

## 2021-11-17 VITALS
SYSTOLIC BLOOD PRESSURE: 112 MMHG | HEIGHT: 70 IN | RESPIRATION RATE: 16 BRPM | BODY MASS INDEX: 23.55 KG/M2 | DIASTOLIC BLOOD PRESSURE: 60 MMHG | HEART RATE: 62 BPM | TEMPERATURE: 97.5 F | OXYGEN SATURATION: 98 % | WEIGHT: 164.5 LBS

## 2021-11-17 DIAGNOSIS — E55.9 VITAMIN D DEFICIENCY: Primary | ICD-10-CM

## 2021-11-17 DIAGNOSIS — Z00.00 WELLNESS EXAMINATION: ICD-10-CM

## 2021-11-17 DIAGNOSIS — N52.8 OTHER MALE ERECTILE DYSFUNCTION: ICD-10-CM

## 2021-11-17 PROCEDURE — 1160F RVW MEDS BY RX/DR IN RCRD: CPT | Performed by: INTERNAL MEDICINE

## 2021-11-17 PROCEDURE — 99212 OFFICE O/P EST SF 10 MIN: CPT | Performed by: INTERNAL MEDICINE

## 2021-11-17 PROCEDURE — G0439 PPPS, SUBSEQ VISIT: HCPCS | Performed by: INTERNAL MEDICINE

## 2021-11-17 PROCEDURE — 1170F FXNL STATUS ASSESSED: CPT | Performed by: INTERNAL MEDICINE

## 2021-11-17 RX ORDER — SILDENAFIL CITRATE 20 MG/1
TABLET ORAL
Qty: 60 TABLET | Refills: 1 | Status: SHIPPED | OUTPATIENT
Start: 2021-11-17 | End: 2022-12-09 | Stop reason: SDUPTHER

## 2021-11-17 NOTE — PROGRESS NOTES
"Chief Complaint  Annual Exam (AWV ) and Hip Pain (pt states rt hip pain has been going on for years )    Subjective          Matthew Serrano presents to [facility]  History of Present Illness  Here for AWV and also refill on sildenafil takes 3 tabs/week for erectile dysfunction..  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  He has no complaints today.  His eye exam is up-to-date.  He has a living will and a copy has been requested.  He sees his dentist twice a year.  Exercises 5 x/week.  No cp and no soa.      Objective   Vital Signs:   /60   Pulse 62   Temp 97.5 °F (36.4 °C) (Temporal)   Resp 16   Ht 177.8 cm (70\")   Wt 74.6 kg (164 lb 8 oz)   SpO2 98%   BMI 23.60 kg/m²     Physical Exam  Vitals and nursing note reviewed.   Constitutional:       Appearance: Normal appearance. He is well-developed.   HENT:      Head: Normocephalic and atraumatic.      Right Ear: External ear normal.      Left Ear: External ear normal.   Eyes:      Extraocular Movements: Extraocular movements intact.      Conjunctiva/sclera: Conjunctivae normal.   Neck:      Vascular: No carotid bruit.   Cardiovascular:      Rate and Rhythm: Normal rate and regular rhythm.      Heart sounds: Normal heart sounds.      Comments: No bruits  Pulmonary:      Effort: Pulmonary effort is normal. No respiratory distress.      Breath sounds: Normal breath sounds. No wheezing or rales.   Abdominal:      General: Bowel sounds are normal. There is no distension.      Palpations: Abdomen is soft. There is no mass.      Tenderness: There is no abdominal tenderness.   Musculoskeletal:      Cervical back: Neck supple.   Lymphadenopathy:      Cervical: No cervical adenopathy.   Skin:     General: Skin is warm.   Neurological:      General: No focal deficit present.      Mental Status: He is alert and oriented to person, place, and time.   Psychiatric:         Mood and Affect: Mood normal.         Behavior: Behavior normal.  "        Thought Content: Thought content normal.         Judgment: Judgment normal.        Result Review :                PSA SCREENING (11/12/2021 08:21)  Vitamin B12 (11/12/2021 08:21)  Folate (11/12/2021 08:21)  CBC w AUTO Differential (11/12/2021 08:21)  CBC & Differential (08/13/2021 11:05)  Comprehensive Metabolic Panel (08/13/2021 11:05)  Lipid Panel With LDL / HDL Ratio (08/13/2021 11:05)  TSH Rfx On Abnormal To Free T4 (08/13/2021 11:05)    Assessment and Plan    Diagnoses and all orders for this visit:    1. Vitamin D deficiency (Primary)    2. Wellness examination    3. Other male erectile dysfunction    Other orders  -     sildenafil (REVATIO) 20 MG tablet; Take 1 to 5 tablets as needed prior to intercourse.  No more than 5 tablets in 24 hours.  Dispense: 60 tablet; Refill: 1        Follow Up   No follow-ups on file.  Patient was given instructions and counseling regarding his condition or for health maintenance advice. Please see specific information pulled into the AVS if appropriate.     AWV completed.  Labs reviewed with patient which look excellent.  I have suggested that he start an over-the-counter B12 supplement as his B12 level is low normal.  Colonoscopy is up-to-date.  PSA is excellent.  Copy of living will requested  Refill for sildenafil provided for erectile dysfunction.  Follow-up 1 year.

## 2021-11-17 NOTE — PROGRESS NOTES
The ABCs of the Annual Wellness Visit  Initial Medicare Wellness Visit    Chief Complaint   Patient presents with   • Annual Exam     AWV    • Hip Pain     pt states rt hip pain has been going on for years      Subjective   History of Present Illness:  Matthew Serrano is a 71 y.o. male who presents for an Initial Medicare Wellness Visit.    The following portions of the patient's history were reviewed and   updated as appropriate: allergies, current medications, past family history, past medical history, past social history, past surgical history and problem list.     Compared to one year ago, the patient feels his physical   health is the same.    Compared to one year ago, the patient feels his mental   health is the same.    Recent Hospitalizations:  He was not admitted to the hospital during the last year.       Current Medical Providers:  Patient Care Team:  Cecelia Davis MD as PCP - General (Internal Medicine)    Outpatient Medications Prior to Visit   Medication Sig Dispense Refill   • Glucosamine-Chondroitin 750-600 MG tablet Take  by mouth.     • Multiple Vitamins-Minerals (MULTI COMPLETE PO) Take  by mouth.     • sildenafil (REVATIO) 20 MG tablet Take 1 to 5 tablets as needed prior to intercourse.  No more than 5 tablets in 24 hours. 60 tablet 1     No facility-administered medications prior to visit.       No opioid medication identified on active medication list. I have reviewed chart for other potential  high risk medication/s and harmful drug interactions in the elderly.          Aspirin is not on active medication list.  Aspirin use is not indicated based on review of current medical condition/s. Risk of harm outweighs potential benefits.  .    Patient Active Problem List   Diagnosis   • Wellness examination   • Other male erectile dysfunction   • Vitamin D deficiency   • Submandibular gland mass     Advance Care Planning  Advance Directive is not on file.  ACP discussion was held with the patient  "during this visit. Patient has an advance directive (not in EMR), copy requested.          Objective       Vitals:    11/17/21 1158   BP: 112/60   Pulse: 62   Resp: 16   Temp: 97.5 °F (36.4 °C)   TempSrc: Temporal   SpO2: 98%   Weight: 74.6 kg (164 lb 8 oz)   Height: 177.8 cm (70\")   PainSc:   2   PainLoc: Hip     BMI Readings from Last 1 Encounters:   11/17/21 23.60 kg/m²   BMI is within normal parameters. No follow-up required.    Does the patient have evidence of cognitive impairment? No    Physical Exam          HEALTH RISK ASSESSMENT    Smoking Status:  Social History     Tobacco Use   Smoking Status Never Smoker   Smokeless Tobacco Never Used     Alcohol Consumption:  Social History     Substance and Sexual Activity   Alcohol Use Yes    Comment: nightly     Fall Risk Screen:    HAL Fall Risk Assessment was completed, and patient is at LOW risk for falls.Assessment completed on:11/17/2021    Depression Screen:   PHQ-2/PHQ-9 Depression Screening 11/17/2021   Little interest or pleasure in doing things 0   Feeling down, depressed, or hopeless 0   Total Score 0       Health Habits and Functional and Cognitive Screening:  Functional & Cognitive Status 11/17/2021   Do you have difficulty preparing food and eating? No   Do you have difficulty bathing yourself, getting dressed or grooming yourself? No   Do you have difficulty using the toilet? No   Do you have difficulty moving around from place to place? No   Do you have trouble with steps or getting out of a bed or a chair? No   Current Diet Well Balanced Diet   Dental Exam Up to date   Eye Exam Up to date   Exercise (times per week) 5 times per week   Current Exercises Include Walking;Yard Work;Aerobics   Current Exercise Activities Include -   Do you need help using the phone?  No   Are you deaf or do you have serious difficulty hearing?  No   Do you need help with transportation? No   Do you need help shopping? No   Do you need help preparing meals?  No   Do " you need help with housework?  No   Do you need help with laundry? No   Do you need help taking your medications? No   Do you need help managing money? No   Do you ever drive or ride in a car without wearing a seat belt? No   Have you felt unusual stress, anger or loneliness in the last month? No   Who do you live with? Spouse   If you need help, do you have trouble finding someone available to you? No   Have you been bothered in the last four weeks by sexual problems? No   Do you have difficulty concentrating, remembering or making decisions? No       Age-appropriate Screening Schedule:  Refer to the list below for future screening recommendations based on patient's age, sex and/or medical conditions. Orders for these recommended tests are listed in the plan section. The patient has been provided with a written plan.    Health Maintenance   Topic Date Due   • ZOSTER VACCINE (1 of 2) Never done   • INFLUENZA VACCINE  Never done   • TDAP/TD VACCINES (2 - Td or Tdap) 08/24/2029            Assessment/Plan   CMS Preventative Services Quick Reference  Risk Factors Identified During Encounter  Hearing Problem  occasional pain in right hip but not major  The above risks/problems have been discussed with the patient.  Follow up actions/plans if indicated are seen below in the Assessment/Plan Section.  Pertinent information has been shared with the patient in the After Visit Summary.    There are no diagnoses linked to this encounter.    Follow Up:  No follow-ups on file.     An After Visit Summary and PPPS were made available to the patient.        I spent 15 minutes caring for Matthew on this date of service. This time includes time spent by me in the following activities:obtaining and/or reviewing a separately obtained history, performing a medically appropriate examination and/or evaluation , counseling and educating the patient/family/caregiver and documenting information in the medical record

## 2022-04-05 ENCOUNTER — TELEPHONE (OUTPATIENT)
Dept: FAMILY MEDICINE CLINIC | Facility: CLINIC | Age: 72
End: 2022-04-05

## 2022-04-05 NOTE — TELEPHONE ENCOUNTER
Caller: Matthew Serrano    Relationship to patient: Self    Best call back number: [phone]    Chief complaint: PATIENT CALLED IN TO SCHEDULE APPOINTMENT, WHEN ASKED WHAT HE NEEDED TO BE SEEN FOR, HE STATED THAT HE HAD BEEN HAVING MINOR CHEST PAIN, AND MINOR DIZZINESS. STATED THAT HE JUST DIDN'T FEEL WELL. I ADVISED PATIENT THAT HE WOULD NEED TO GO TO THE ER. HE STATED UNDERSTANDING.     Patient directed to call 911 or go to their nearest emergency room.     Patient verbalized understanding: [x] Yes  [] No  If no, why?    Additional notes:

## 2022-04-06 ENCOUNTER — OFFICE VISIT (OUTPATIENT)
Dept: FAMILY MEDICINE CLINIC | Facility: CLINIC | Age: 72
End: 2022-04-06

## 2022-04-06 VITALS
SYSTOLIC BLOOD PRESSURE: 128 MMHG | TEMPERATURE: 96 F | BODY MASS INDEX: 24.25 KG/M2 | HEIGHT: 70 IN | DIASTOLIC BLOOD PRESSURE: 62 MMHG | OXYGEN SATURATION: 99 % | WEIGHT: 169.4 LBS | HEART RATE: 54 BPM

## 2022-04-06 DIAGNOSIS — R53.83 FATIGUE, UNSPECIFIED TYPE: ICD-10-CM

## 2022-04-06 DIAGNOSIS — R07.89 ATYPICAL CHEST PAIN: ICD-10-CM

## 2022-04-06 DIAGNOSIS — M54.6 ACUTE LEFT-SIDED THORACIC BACK PAIN: Primary | ICD-10-CM

## 2022-04-06 PROCEDURE — 93000 ELECTROCARDIOGRAM COMPLETE: CPT | Performed by: NURSE PRACTITIONER

## 2022-04-06 PROCEDURE — 99214 OFFICE O/P EST MOD 30 MIN: CPT | Performed by: NURSE PRACTITIONER

## 2022-04-06 RX ORDER — LANOLIN ALCOHOL/MO/W.PET/CERES
1000 CREAM (GRAM) TOPICAL DAILY
COMMUNITY

## 2022-04-06 NOTE — PROGRESS NOTES
"Chief Complaint  URI (X 2 wks, was in [place] for a month, juliano back for a week, pain in between shoulder blades)    Subjective          Matthew Serrano presents to [facility]  Matthew is a 71 year old male presenting for back pain and cough that developed in March 2022. He drove down the [place] and spent the month of March there. The pain is a sporadic ache that originates between his shoulder blades just left of midline. Sometimes radiates to chest. He thought it was a pulled muscle but it has not gotten any better. He has not tried to treat the pain at home. He has not identified any triggers. Pain does not worsen with breathing or exercise. He does not feel as good as he normally does. Feels fatigued and \"spacey\" but no dizziness. Cough is mostly non-productive, will have scant clear sputum occasionally. He feels like he needs to clear his throat. No shortness or breath, no wheezing. No fevers or chills. No night sweats. No calf pain or swelling. He does report improvement in his cough since returning home from [place].     He has a significant family history of cardiac disease, previous work up with Dr. Tena in 01/202. He completed a stress test and echocardiogram which was normal per patient and chart records. He is traveling to [place] in the next six weeks and wants to ensure his heart is otherwise healthy.  Back Pain  This is a new problem. The current episode started 1 to 4 weeks ago. The problem has been waxing and waning since onset. The pain is present in the thoracic spine. The quality of the pain is described as aching. Radiates to: chest. The pain is moderate. Associated symptoms include chest pain. Pertinent negatives include no abdominal pain, bladder incontinence, bowel incontinence, dysuria, fever, headaches, leg pain, numbness, paresis, paresthesias, pelvic pain, perianal numbness, tingling, weakness or weight loss. He has tried nothing for the symptoms. " "      Objective   Vital Signs:   /62 (BP Location: Right arm, Patient Position: Sitting, Cuff Size: Adult)   Pulse 54   Temp 96 °F (35.6 °C) (Temporal)   Ht 177.8 cm (70\")   Wt 76.8 kg (169 lb 6.4 oz)   SpO2 99%   BMI 24.31 kg/m²     BMI is within normal parameters. No follow-up required.      Physical Exam  Constitutional:       Appearance: Normal appearance.   HENT:      Head: Normocephalic and atraumatic.      Right Ear: Tympanic membrane, ear canal and external ear normal.      Left Ear: Tympanic membrane, ear canal and external ear normal.      Nose: Nose normal.      Mouth/Throat:      Mouth: Mucous membranes are moist.      Pharynx: Oropharynx is clear. Uvula midline.   Eyes:      Conjunctiva/sclera: Conjunctivae normal.   Cardiovascular:      Rate and Rhythm: Normal rate and regular rhythm.      Heart sounds: Normal heart sounds.   Pulmonary:      Effort: Pulmonary effort is normal.      Breath sounds: Normal breath sounds. No wheezing, rhonchi or rales.   Musculoskeletal:         General: No tenderness.      Cervical back: Neck supple.      Comments: No tenderness to palpation of posterior thorax   Lymphadenopathy:      Cervical: No cervical adenopathy.   Skin:     General: Skin is warm and dry.   Neurological:      General: No focal deficit present.      Mental Status: He is alert and oriented to person, place, and time.   Psychiatric:         Mood and Affect: Mood normal.         Behavior: Behavior normal.        Result Review :            ECG 12 Lead    Date/Time: 4/6/2022 10:01 AM  Performed by: Jojo Britt APRN  Authorized by: Jojo Britt APRN   Comparison: compared with previous ECG from 1/21/2021  Similar to previous ECG  Rhythm: sinus bradycardia  Rate: bradycardic  BPM: 51  Conduction: conduction normal  ST Segments: ST segments normal  QRS axis: normal  Other: no other findings  Comments: No change from previous EKG, no abnormal findings            "   Assessment and Plan    Diagnoses and all orders for this visit:    1. Acute left-sided thoracic back pain (Primary)  -     ECG 12 Lead    2. Fatigue, unspecified type  -     CBC & Differential  -     Comprehensive metabolic panel    3. Atypical chest pain  -     ECG 12 Lead        Follow Up   No follow-ups on file.  Patient was given instructions and counseling regarding his condition or for health maintenance advice. Please see specific information pulled into the AVS if appropriate.     Patient with increased fatigue, cough and back pain radiating into chest, does not correlate with cough, no pain with inhalation, no shortness of breaht. Does have significant family history of cardiac disease, previous work up in 01/21/2021 was normal, stress and echo completed. He is established with Dr. Tena. Recommend follow up with cardiology, consider calcium scoring based on family history. He does report an occasional cough that has improved since returning home, and increased fatigue. Will proceed with cbc/cmp today. May have allergy component however lung fields are clear. He denies family history of AAA, he is a nonsmoker.     Recommend nsaid to see if symptoms improve. Consider chest xray for continued symptoms, however his lungs are clear, oxygen is normal and cough does not seem to correlate back/chest pain. Discussed pleurisy and cardiac events that could impact his symptoms. His cough sounds unrelated to back pain and chest pain and his cough is improving. He was not febrile. His back pain is not reproducible.

## 2022-04-07 LAB
ALBUMIN SERPL-MCNC: 4.5 G/DL (ref 3.8–4.8)
ALBUMIN/GLOB SERPL: 1.7 {RATIO} (ref 1.2–2.2)
ALP SERPL-CCNC: 60 IU/L (ref 44–121)
ALT SERPL-CCNC: 15 IU/L (ref 0–44)
AST SERPL-CCNC: 20 IU/L (ref 0–40)
BASOPHILS # BLD AUTO: 0 X10E3/UL (ref 0–0.2)
BASOPHILS NFR BLD AUTO: 1 %
BILIRUB SERPL-MCNC: 0.6 MG/DL (ref 0–1.2)
BUN SERPL-MCNC: 16 MG/DL (ref 8–27)
BUN/CREAT SERPL: 15 (ref 10–24)
CALCIUM SERPL-MCNC: 9.8 MG/DL (ref 8.6–10.2)
CHLORIDE SERPL-SCNC: 101 MMOL/L (ref 96–106)
CO2 SERPL-SCNC: 22 MMOL/L (ref 20–29)
CREAT SERPL-MCNC: 1.09 MG/DL (ref 0.76–1.27)
EGFRCR SERPLBLD CKD-EPI 2021: 73 ML/MIN/1.73
EOSINOPHIL # BLD AUTO: 0.1 X10E3/UL (ref 0–0.4)
EOSINOPHIL NFR BLD AUTO: 1 %
ERYTHROCYTE [DISTWIDTH] IN BLOOD BY AUTOMATED COUNT: 11.9 % (ref 11.6–15.4)
GLOBULIN SER CALC-MCNC: 2.6 G/DL (ref 1.5–4.5)
GLUCOSE SERPL-MCNC: 96 MG/DL (ref 65–99)
HCT VFR BLD AUTO: 46.3 % (ref 37.5–51)
HGB BLD-MCNC: 15.8 G/DL (ref 13–17.7)
IMM GRANULOCYTES # BLD AUTO: 0 X10E3/UL (ref 0–0.1)
IMM GRANULOCYTES NFR BLD AUTO: 0 %
LYMPHOCYTES # BLD AUTO: 1.6 X10E3/UL (ref 0.7–3.1)
LYMPHOCYTES NFR BLD AUTO: 27 %
MCH RBC QN AUTO: 32.7 PG (ref 26.6–33)
MCHC RBC AUTO-ENTMCNC: 34.1 G/DL (ref 31.5–35.7)
MCV RBC AUTO: 96 FL (ref 79–97)
MONOCYTES # BLD AUTO: 0.5 X10E3/UL (ref 0.1–0.9)
MONOCYTES NFR BLD AUTO: 8 %
NEUTROPHILS # BLD AUTO: 3.7 X10E3/UL (ref 1.4–7)
NEUTROPHILS NFR BLD AUTO: 63 %
PLATELET # BLD AUTO: 208 X10E3/UL (ref 150–450)
POTASSIUM SERPL-SCNC: 5.3 MMOL/L (ref 3.5–5.2)
PROT SERPL-MCNC: 7.1 G/DL (ref 6–8.5)
RBC # BLD AUTO: 4.83 X10E6/UL (ref 4.14–5.8)
SODIUM SERPL-SCNC: 137 MMOL/L (ref 134–144)
WBC # BLD AUTO: 5.8 X10E3/UL (ref 3.4–10.8)

## 2022-12-09 ENCOUNTER — OFFICE VISIT (OUTPATIENT)
Dept: FAMILY MEDICINE CLINIC | Facility: CLINIC | Age: 72
End: 2022-12-09

## 2022-12-09 VITALS
SYSTOLIC BLOOD PRESSURE: 104 MMHG | BODY MASS INDEX: 23.29 KG/M2 | RESPIRATION RATE: 16 BRPM | DIASTOLIC BLOOD PRESSURE: 52 MMHG | TEMPERATURE: 96.9 F | OXYGEN SATURATION: 100 % | HEIGHT: 70 IN | HEART RATE: 57 BPM | WEIGHT: 162.7 LBS

## 2022-12-09 DIAGNOSIS — D75.89 MACROCYTOSIS WITHOUT ANEMIA: ICD-10-CM

## 2022-12-09 DIAGNOSIS — Z12.11 COLON CANCER SCREENING: ICD-10-CM

## 2022-12-09 DIAGNOSIS — N52.8 OTHER MALE ERECTILE DYSFUNCTION: ICD-10-CM

## 2022-12-09 DIAGNOSIS — Z23 ENCOUNTER FOR IMMUNIZATION: Primary | ICD-10-CM

## 2022-12-09 DIAGNOSIS — E55.9 VITAMIN D DEFICIENCY: ICD-10-CM

## 2022-12-09 DIAGNOSIS — Z12.5 SCREENING FOR PROSTATE CANCER: ICD-10-CM

## 2022-12-09 DIAGNOSIS — Z83.49 FAMILY HISTORY OF B12 DEFICIENCY: ICD-10-CM

## 2022-12-09 PROCEDURE — 1170F FXNL STATUS ASSESSED: CPT | Performed by: INTERNAL MEDICINE

## 2022-12-09 PROCEDURE — G0439 PPPS, SUBSEQ VISIT: HCPCS | Performed by: INTERNAL MEDICINE

## 2022-12-09 PROCEDURE — 99213 OFFICE O/P EST LOW 20 MIN: CPT | Performed by: INTERNAL MEDICINE

## 2022-12-09 PROCEDURE — 90677 PCV20 VACCINE IM: CPT | Performed by: INTERNAL MEDICINE

## 2022-12-09 PROCEDURE — 1160F RVW MEDS BY RX/DR IN RCRD: CPT | Performed by: INTERNAL MEDICINE

## 2022-12-09 PROCEDURE — G0009 ADMIN PNEUMOCOCCAL VACCINE: HCPCS | Performed by: INTERNAL MEDICINE

## 2022-12-09 RX ORDER — SILDENAFIL CITRATE 20 MG/1
TABLET ORAL
Qty: 60 TABLET | Refills: 1 | Status: SHIPPED | OUTPATIENT
Start: 2022-12-09 | End: 2023-01-08

## 2022-12-09 NOTE — PROGRESS NOTES
"Chief Complaint  Medicare Wellness-subsequent    Subjective        Matthew Serrano presents to [facility]  History of Present Illness  Patient is here today for an adult wellness visit as well as to discuss vaccinations.  He also needs prostate cancer screening today.  He has erectile dysfunction and does take sildenafil 20 mg tablets as needed for ED.  He does need a refill on this.  He denies chest pain or shortness of breath.  No edema.  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  He does complain of hearing loss.  He is interested in discussing the benefits of hearing aids.  He is good about having his vision screening done once a year.  He just went to the eye doctor couple days ago.  He does have a living will and a copy has been requested.  He has never had a pneumonia shot.  He is not interested in the flu shot today.  He has not had the shingles vaccine either.  Last year on our lab work we did uncover that he has borderline B12 deficiency.  He has been taking a B12 supplement daily along with a multivitamin that contains D3 and it.  He has a history of vitamin D deficiency as well and takes an extra D3 3 times a week.  Objective   Vital Signs:  /52 (BP Location: Left arm, Patient Position: Sitting, Cuff Size: Small Adult)   Pulse 57   Temp 96.9 °F (36.1 °C) (Temporal)   Resp 16   Ht 177.8 cm (70\")   Wt 73.8 kg (162 lb 11.2 oz)   SpO2 100%   BMI 23.35 kg/m²   Estimated body mass index is 23.35 kg/m² as calculated from the following:    Height as of this encounter: 177.8 cm (70\").    Weight as of this encounter: 73.8 kg (162 lb 11.2 oz).    BMI is within normal parameters. No other follow-up for BMI required.      Physical Exam  Vitals and nursing note reviewed.   Constitutional:       Appearance: Normal appearance. He is well-developed.   HENT:      Head: Normocephalic and atraumatic.      Right Ear: External ear normal.      Left Ear: " External ear normal.   Eyes:      Extraocular Movements: Extraocular movements intact.      Conjunctiva/sclera: Conjunctivae normal.   Neck:      Vascular: No carotid bruit.   Cardiovascular:      Rate and Rhythm: Normal rate and regular rhythm.      Heart sounds: Normal heart sounds.      Comments: No bruits  Pulmonary:      Effort: Pulmonary effort is normal. No respiratory distress.      Breath sounds: Normal breath sounds. No wheezing or rales.   Abdominal:      General: Bowel sounds are normal. There is no distension.      Palpations: Abdomen is soft. There is no mass.      Tenderness: There is no abdominal tenderness.   Musculoskeletal:      Cervical back: Neck supple.   Lymphadenopathy:      Cervical: No cervical adenopathy.   Skin:     General: Skin is warm.   Neurological:      General: No focal deficit present.      Mental Status: He is alert and oriented to person, place, and time.   Psychiatric:         Mood and Affect: Mood normal.         Behavior: Behavior normal.         Thought Content: Thought content normal.         Judgment: Judgment normal.        Result Review :                Assessment and Plan   Diagnoses and all orders for this visit:    1. Encounter for immunization (Primary)  -     Pneumococcal Conjugate Vaccine 20-Valent (PCV20)    2. Colon cancer screening  -     Ambulatory Referral For Screening Colonoscopy    3. Vitamin D deficiency  -     Vitamin D,25-Hydroxy    4. Other male erectile dysfunction  -     Basic Metabolic Panel    5. Screening for prostate cancer  -     PSA Screen    6. Family history of B12 deficiency  -     Vitamin B12    7. Macrocytosis without anemia  -     CBC & Differential  -     Basic Metabolic Panel    Other orders  -     sildenafil (REVATIO) 20 MG tablet; Take 1 to 5 tablets as needed prior to intercourse.  No more than 5 tablets in 24 hours.  Dispense: 60 tablet; Refill: 1    Vaccinations have been discussed.  He is due for pneumonia and so therefore I have  ordered a pneumococcal 20.  We also discussed his history of vitamin B12 and D3 deficiencies.  We will check those levels today.  He is due for screening PSA which we ordered today.  I did refill his sildenafil.  I also would like to check his blood count to follow-up on the macrocytosis.  He is eligible for shingles vaccine series but he can do that at his pharmacy.  He declined the flu shot today.  We also discussed the importance of having his hearing checked and possibly starting the process of obtaining hearing aids.  I recommended that he go to [facility] where it seems to be the most economical.  We did discuss the correlation between hearing loss and cognitive changes.  His dental visits are up-to-date as well as his eye exam.  I will see him back in 1 year or sooner if needed.         Follow Up   Return in about 1 year (around 12/9/2023) for Medicare Wellness.  Patient was given instructions and counseling regarding his condition or for health maintenance advice. Please see specific information pulled into the AVS if appropriate.

## 2022-12-09 NOTE — PROGRESS NOTES
The ABCs of the Annual Wellness Visit  Subsequent Medicare Wellness Visit    Subjective      Matthew Serrano is a 72 y.o. male who presents for a Subsequent Medicare Wellness Visit.    The following portions of the patient's history were reviewed and   updated as appropriate: allergies, current medications, past family history, past medical history, past social history, past surgical history and problem list.    Compared to one year ago, the patient feels his physical   health is the same.    Compared to one year ago, the patient feels his mental   health is the same.    Recent Hospitalizations:  He was not admitted to the hospital during the last year.       Current Medical Providers:  Patient Care Team:  Cecelia Davis MD as PCP - General (Internal Medicine)    Outpatient Medications Prior to Visit   Medication Sig Dispense Refill   • Glucosamine-Chondroitin 750-600 MG tablet Take  by mouth.     • Multiple Vitamins-Minerals (MULTI COMPLETE PO) Take  by mouth.     • vitamin B-12 (CYANOCOBALAMIN) 1000 MCG tablet Take 1,000 mcg by mouth Daily.     • sildenafil (REVATIO) 20 MG tablet Take 1 to 5 tablets as needed prior to intercourse.  No more than 5 tablets in 24 hours. 60 tablet 1     No facility-administered medications prior to visit.       No opioid medication identified on active medication list. I have reviewed chart for other potential  high risk medication/s and harmful drug interactions in the elderly.          Aspirin is not on active medication list.  Aspirin use is not indicated based on review of current medical condition/s. Risk of harm outweighs potential benefits.  .    Patient Active Problem List   Diagnosis   • Wellness examination   • Other male erectile dysfunction   • Vitamin D deficiency   • Submandibular gland mass     Advance Care Planning  Advance Directive is not on file.  ACP discussion was declined by the patient. Patient has an advance directive (not in EMR), copy requested.    "  Objective    Vitals:    12/09/22 0957   BP: 104/52   BP Location: Left arm   Patient Position: Sitting   Cuff Size: Small Adult   Pulse: 57   Resp: 16   Temp: 96.9 °F (36.1 °C)   TempSrc: Temporal   SpO2: 100%   Weight: 73.8 kg (162 lb 11.2 oz)   Height: 177.8 cm (70\")   PainSc: 0-No pain     Estimated body mass index is 23.35 kg/m² as calculated from the following:    Height as of this encounter: 177.8 cm (70\").    Weight as of this encounter: 73.8 kg (162 lb 11.2 oz).    BMI is within normal parameters. No other follow-up for BMI required.      Does the patient have evidence of cognitive impairment?   No            HEALTH RISK ASSESSMENT    Smoking Status:  Social History     Tobacco Use   Smoking Status Never   Smokeless Tobacco Never     Alcohol Consumption:  Social History     Substance and Sexual Activity   Alcohol Use Yes    Comment: nightly     Fall Risk Screen:    ALIVIAADI Fall Risk Assessment was completed, and patient is at LOW risk for falls.Assessment completed on:12/9/2022    Depression Screening:  PHQ-2/PHQ-9 Depression Screening 12/9/2022   Retired PHQ-9 Total Score -   Retired Total Score -   Little Interest or Pleasure in Doing Things 0-->not at all   Feeling Down, Depressed or Hopeless 0-->not at all   PHQ-9: Brief Depression Severity Measure Score 0       Health Habits and Functional and Cognitive Screening:  Functional & Cognitive Status 12/9/2022   Do you have difficulty preparing food and eating? No   Do you have difficulty bathing yourself, getting dressed or grooming yourself? No   Do you have difficulty using the toilet? No   Do you have difficulty moving around from place to place? No   Do you have trouble with steps or getting out of a bed or a chair? No   Current Diet Well Balanced Diet   Dental Exam Up to date   Eye Exam Up to date   Exercise (times per week) 5 times per week   Current Exercises Include Walking;House Cleaning   Current Exercise Activities Include -   Do you need help " using the phone?  No   Are you deaf or do you have serious difficulty hearing?  Yes   Do you need help with transportation? No   Do you need help shopping? No   Do you need help preparing meals?  No   Do you need help with housework?  No   Do you need help with laundry? No   Do you need help taking your medications? No   Do you need help managing money? No   Do you ever drive or ride in a car without wearing a seat belt? No   Have you felt unusual stress, anger or loneliness in the last month? No   Who do you live with? Spouse   If you need help, do you have trouble finding someone available to you? No   Have you been bothered in the last four weeks by sexual problems? No   Do you have difficulty concentrating, remembering or making decisions? No       Age-appropriate Screening Schedule:  Refer to the list below for future screening recommendations based on patient's age, sex and/or medical conditions. Orders for these recommended tests are listed in the plan section. The patient has been provided with a written plan.    Health Maintenance   Topic Date Due   • ZOSTER VACCINE (1 of 2) 12/09/2022 (Originally 10/19/2002)   • INFLUENZA VACCINE  03/31/2023 (Originally 8/1/2022)   • TDAP/TD VACCINES (2 - Td or Tdap) 08/24/2029                CMS Preventative Services Quick Reference  Risk Factors Identified During Encounter:    Hearing Problem: he will make an appointment with [facility]  Immunizations Discussed/Encouraged: Influenza, Prevnar 20 (Pneumococcal 20-valent conjugate) and Shingrix  Dental Screening Recommended  Vision Screening Recommended    The above risks/problems have been discussed with the patient.  Pertinent information has been shared with the patient in the After Visit Summary.    Diagnoses and all orders for this visit:    1. Encounter for immunization (Primary)  -     Pneumococcal Conjugate Vaccine 20-Valent (PCV20)    2. Colon cancer screening  -     Ambulatory Referral For Screening Colonoscopy    3.  Vitamin D deficiency  -     Vitamin D,25-Hydroxy    4. Other male erectile dysfunction  -     Basic Metabolic Panel    5. Screening for prostate cancer  -     PSA Screen    6. Family history of B12 deficiency  -     Vitamin B12    7. Macrocytosis without anemia  -     CBC & Differential  -     Basic Metabolic Panel    Other orders  -     sildenafil (REVATIO) 20 MG tablet; Take 1 to 5 tablets as needed prior to intercourse.  No more than 5 tablets in 24 hours.  Dispense: 60 tablet; Refill: 1        Follow Up:   Next Medicare Wellness visit to be scheduled in 1 year.      An After Visit Summary and PPPS were made available to the patient.

## 2022-12-10 LAB
25(OH)D3+25(OH)D2 SERPL-MCNC: 46.3 NG/ML (ref 30–100)
BASOPHILS # BLD AUTO: 0.04 10*3/MM3 (ref 0–0.2)
BASOPHILS NFR BLD AUTO: 0.8 % (ref 0–1.5)
BUN SERPL-MCNC: 16 MG/DL (ref 8–23)
BUN/CREAT SERPL: 16.8 (ref 7–25)
CALCIUM SERPL-MCNC: 10.1 MG/DL (ref 8.6–10.5)
CHLORIDE SERPL-SCNC: 103 MMOL/L (ref 98–107)
CO2 SERPL-SCNC: 29.5 MMOL/L (ref 22–29)
CREAT SERPL-MCNC: 0.95 MG/DL (ref 0.76–1.27)
EGFRCR SERPLBLD CKD-EPI 2021: 86.1 ML/MIN/1.73
EOSINOPHIL # BLD AUTO: 0.07 10*3/MM3 (ref 0–0.4)
EOSINOPHIL NFR BLD AUTO: 1.3 % (ref 0.3–6.2)
ERYTHROCYTE [DISTWIDTH] IN BLOOD BY AUTOMATED COUNT: 11.6 % (ref 12.3–15.4)
GLUCOSE SERPL-MCNC: 70 MG/DL (ref 65–99)
HCT VFR BLD AUTO: 43.6 % (ref 37.5–51)
HGB BLD-MCNC: 15 G/DL (ref 13–17.7)
IMM GRANULOCYTES # BLD AUTO: 0.02 10*3/MM3 (ref 0–0.05)
IMM GRANULOCYTES NFR BLD AUTO: 0.4 % (ref 0–0.5)
LYMPHOCYTES # BLD AUTO: 1.55 10*3/MM3 (ref 0.7–3.1)
LYMPHOCYTES NFR BLD AUTO: 29.5 % (ref 19.6–45.3)
MCH RBC QN AUTO: 33.6 PG (ref 26.6–33)
MCHC RBC AUTO-ENTMCNC: 34.4 G/DL (ref 31.5–35.7)
MCV RBC AUTO: 97.5 FL (ref 79–97)
MONOCYTES # BLD AUTO: 0.35 10*3/MM3 (ref 0.1–0.9)
MONOCYTES NFR BLD AUTO: 6.7 % (ref 5–12)
NEUTROPHILS # BLD AUTO: 3.22 10*3/MM3 (ref 1.7–7)
NEUTROPHILS NFR BLD AUTO: 61.3 % (ref 42.7–76)
NRBC BLD AUTO-RTO: 0 /100 WBC (ref 0–0.2)
PLATELET # BLD AUTO: 216 10*3/MM3 (ref 140–450)
POTASSIUM SERPL-SCNC: 5.4 MMOL/L (ref 3.5–5.2)
PSA SERPL-MCNC: 0.83 NG/ML (ref 0–4)
RBC # BLD AUTO: 4.47 10*6/MM3 (ref 4.14–5.8)
SODIUM SERPL-SCNC: 139 MMOL/L (ref 136–145)
VIT B12 SERPL-MCNC: >2000 PG/ML (ref 211–946)
WBC # BLD AUTO: 5.25 10*3/MM3 (ref 3.4–10.8)

## 2022-12-11 DIAGNOSIS — E87.5 HYPERKALEMIA: Primary | ICD-10-CM

## 2023-01-06 ENCOUNTER — TELEPHONE (OUTPATIENT)
Dept: FAMILY MEDICINE CLINIC | Facility: CLINIC | Age: 73
End: 2023-01-06

## 2023-01-06 NOTE — TELEPHONE ENCOUNTER
Caller: Berna Serrano    Relationship: Emergency Contact    Best call back number: [phone]    What medication are you requesting: PAXLOVID     What are your current symptoms: FEVER, CONGESTION, COUGH, HEADACHE    How long have you been experiencing symptoms: PATIENT STARTED GETTING SICK ON Wednesday, 01/04/23    Have you had these symptoms before:    [x] Yes  [] No    Have you been treated for these symptoms before:   [x] Yes  [] No    If a prescription is needed, what is your preferred pharmacy and phone number: [facility] [id number] [place], [place] - [address] [facility]  AT [address] [phone] [facility] [phone]      Additional notes:  PATIENT'S WIFE CALLED AND SAID THAT HER  TESTED POSITIVE FOR COVID-19 TODAY WITH A HOME TEST, SHE IS WANTING TO SEE IF DR. FERNANDEZ WILL SEND IN PAXLOVID FOR HIM

## 2023-01-09 NOTE — TELEPHONE ENCOUNTER
Spoke with patient in detail. He had already spoken with Dr. Davis today. He is doing good. He did not get Paxlovid.

## 2023-09-27 ENCOUNTER — TELEPHONE (OUTPATIENT)
Dept: FAMILY MEDICINE CLINIC | Facility: CLINIC | Age: 73
End: 2023-09-27
Payer: MEDICARE

## 2023-09-27 NOTE — TELEPHONE ENCOUNTER
Called pt and left VM to reschedule appt Dr. Davis will be out of the office. Ok for hub to read and xfer to Colleen in office

## 2023-11-20 VITALS
SYSTOLIC BLOOD PRESSURE: 92 MMHG | OXYGEN SATURATION: 100 % | HEART RATE: 54 BPM | RESPIRATION RATE: 15 BRPM | DIASTOLIC BLOOD PRESSURE: 63 MMHG | HEIGHT: 70 IN | TEMPERATURE: 96.8 F | RESPIRATION RATE: 19 BRPM | DIASTOLIC BLOOD PRESSURE: 50 MMHG | SYSTOLIC BLOOD PRESSURE: 119 MMHG | DIASTOLIC BLOOD PRESSURE: 61 MMHG | SYSTOLIC BLOOD PRESSURE: 122 MMHG | SYSTOLIC BLOOD PRESSURE: 94 MMHG | SYSTOLIC BLOOD PRESSURE: 99 MMHG | OXYGEN SATURATION: 99 % | DIASTOLIC BLOOD PRESSURE: 62 MMHG | HEART RATE: 56 BPM | SYSTOLIC BLOOD PRESSURE: 101 MMHG | DIASTOLIC BLOOD PRESSURE: 75 MMHG | HEART RATE: 63 BPM | RESPIRATION RATE: 16 BRPM | RESPIRATION RATE: 17 BRPM | DIASTOLIC BLOOD PRESSURE: 67 MMHG | HEART RATE: 65 BPM | SYSTOLIC BLOOD PRESSURE: 98 MMHG | HEART RATE: 47 BPM | DIASTOLIC BLOOD PRESSURE: 57 MMHG | HEART RATE: 72 BPM | RESPIRATION RATE: 14 BRPM | TEMPERATURE: 97.2 F | SYSTOLIC BLOOD PRESSURE: 97 MMHG | DIASTOLIC BLOOD PRESSURE: 58 MMHG | WEIGHT: 154 LBS | SYSTOLIC BLOOD PRESSURE: 121 MMHG | HEART RATE: 62 BPM | OXYGEN SATURATION: 97 %

## 2023-11-27 ENCOUNTER — AMBULATORY SURGICAL CENTER (AMBULATORY)
Dept: URBAN - METROPOLITAN AREA SURGERY 17 | Facility: SURGERY | Age: 73
End: 2023-11-27
Payer: MEDICARE

## 2023-11-27 DIAGNOSIS — Z09 ENCOUNTER FOR FOLLOW-UP EXAMINATION AFTER COMPLETED TREATMEN: ICD-10-CM

## 2023-11-27 DIAGNOSIS — Z86.010 PERSONAL HISTORY OF COLONIC POLYPS: ICD-10-CM

## 2023-11-27 PROCEDURE — G0105 COLORECTAL SCRN; HI RISK IND: HCPCS | Performed by: INTERNAL MEDICINE

## 2024-01-03 ENCOUNTER — TELEPHONE (OUTPATIENT)
Dept: FAMILY MEDICINE CLINIC | Facility: CLINIC | Age: 74
End: 2024-01-03
Payer: MEDICARE

## 2024-01-03 DIAGNOSIS — N52.8 OTHER MALE ERECTILE DYSFUNCTION: ICD-10-CM

## 2024-01-03 DIAGNOSIS — R53.83 FATIGUE, UNSPECIFIED TYPE: ICD-10-CM

## 2024-01-03 DIAGNOSIS — D75.89 MACROCYTOSIS WITHOUT ANEMIA: ICD-10-CM

## 2024-01-03 DIAGNOSIS — E55.9 VITAMIN D DEFICIENCY: Primary | ICD-10-CM

## 2024-01-03 DIAGNOSIS — Z83.49 FAMILY HISTORY OF B12 DEFICIENCY: ICD-10-CM

## 2024-01-03 DIAGNOSIS — Z00.00 WELLNESS EXAMINATION: ICD-10-CM

## 2024-01-03 NOTE — TELEPHONE ENCOUNTER
Caller: Berna Serrano    Relationship: Emergency Contact    Best call back number: [phone] (PATIENT'S #)    What orders are you requesting (i.e. lab or imaging): LABS    In what timeframe would the patient need to come in: IN TIME  FOR RESULTS AT APPOINTMENT 1/10/2024    Where will you receive your lab/imaging services: IN OFFICE    Additional notes: PLEASE CALL PATIENT TO SCHEDULE WHEN ORDERS ARE PLACED

## 2024-01-05 ENCOUNTER — TELEPHONE (OUTPATIENT)
Dept: FAMILY MEDICINE CLINIC | Facility: CLINIC | Age: 74
End: 2024-01-05

## 2024-01-05 NOTE — TELEPHONE ENCOUNTER
Hub staff attempted to follow warm transfer process and was unsuccessful     Caller: Berna Serrano    Relationship to patient: Emergency Contact    Best call back number: [phone]     Patient is needing: NEEDING TO SCHEDULE LAB APPOINTMENT

## 2024-01-10 ENCOUNTER — OFFICE VISIT (OUTPATIENT)
Dept: FAMILY MEDICINE CLINIC | Facility: CLINIC | Age: 74
End: 2024-01-10
Payer: MEDICARE

## 2024-01-10 VITALS
DIASTOLIC BLOOD PRESSURE: 68 MMHG | BODY MASS INDEX: 23.67 KG/M2 | SYSTOLIC BLOOD PRESSURE: 114 MMHG | TEMPERATURE: 97.7 F | HEART RATE: 50 BPM | WEIGHT: 165.34 LBS | OXYGEN SATURATION: 99 % | HEIGHT: 70 IN

## 2024-01-10 DIAGNOSIS — N52.8 OTHER MALE ERECTILE DYSFUNCTION: ICD-10-CM

## 2024-01-10 DIAGNOSIS — D75.89 MACROCYTOSIS WITHOUT ANEMIA: Primary | ICD-10-CM

## 2024-01-10 DIAGNOSIS — Z12.5 PROSTATE CANCER SCREENING: ICD-10-CM

## 2024-01-10 DIAGNOSIS — Z83.49 FAMILY HISTORY OF B12 DEFICIENCY: ICD-10-CM

## 2024-01-10 DIAGNOSIS — E55.9 VITAMIN D DEFICIENCY: ICD-10-CM

## 2024-01-10 PROCEDURE — 1170F FXNL STATUS ASSESSED: CPT | Performed by: INTERNAL MEDICINE

## 2024-01-10 PROCEDURE — G0439 PPPS, SUBSEQ VISIT: HCPCS | Performed by: INTERNAL MEDICINE

## 2024-01-10 PROCEDURE — 1159F MED LIST DOCD IN RCRD: CPT | Performed by: INTERNAL MEDICINE

## 2024-01-10 PROCEDURE — 99213 OFFICE O/P EST LOW 20 MIN: CPT | Performed by: INTERNAL MEDICINE

## 2024-01-10 PROCEDURE — 1160F RVW MEDS BY RX/DR IN RCRD: CPT | Performed by: INTERNAL MEDICINE

## 2024-01-10 RX ORDER — SILDENAFIL CITRATE 20 MG/1
TABLET ORAL
COMMUNITY
End: 2024-01-10 | Stop reason: SDUPTHER

## 2024-01-10 RX ORDER — SILDENAFIL CITRATE 20 MG/1
TABLET ORAL
Qty: 75 TABLET | Refills: 3 | Status: SHIPPED | OUTPATIENT
Start: 2024-01-10

## 2024-01-10 NOTE — PROGRESS NOTES
The ABCs of the Annual Wellness Visit  Subsequent Medicare Wellness Visit    Subjective    Matthew Serrano is a 73 y.o. male who presents for a Subsequent Medicare Wellness Visit.    The following portions of the patient's history were reviewed and   updated as appropriate: allergies, current medications, past family history, past medical history, past social history, past surgical history, and problem list.    Compared to one year ago, the patient feels his physical   health is the same.    Compared to one year ago, the patient feels his mental   health is the same.    Recent Hospitalizations:  He was not admitted to the hospital during the last year.       Current Medical Providers:  Patient Care Team:  Cecelia Davis MD as PCP - General (Internal Medicine)    Outpatient Medications Prior to Visit   Medication Sig Dispense Refill    Glucosamine-Chondroitin 750-600 MG tablet Take  by mouth.      Multiple Vitamins-Minerals (MULTI COMPLETE PO) Take  by mouth.      vitamin B-12 (CYANOCOBALAMIN) 1000 MCG tablet Take 1 tablet by mouth Daily.      sildenafil (REVATIO) 20 MG tablet 0       No facility-administered medications prior to visit.       No opioid medication identified on active medication list. I have reviewed chart for other potential  high risk medication/s and harmful drug interactions in the elderly.        Aspirin is not on active medication list.  Aspirin use is not indicated based on review of current medical condition/s. Risk of harm outweighs potential benefits.  .    Patient Active Problem List   Diagnosis    Wellness examination    Other male erectile dysfunction    Vitamin D deficiency    Submandibular gland mass     Advance Care Planning   Advance Care Planning     Advance Directive is not on file.  ACP discussion was held with the patient during this visit. Patient has an advance directive (not in EMR), copy requested.     Objective    Vitals:    01/10/24 1117   BP: 114/68   Pulse: 50   Temp:  "97.7 °F (36.5 °C)   TempSrc: Temporal   SpO2: 99%   Weight: 75 kg (165 lb 5.4 oz)   Height: 177.8 cm (70\")     Estimated body mass index is 23.72 kg/m² as calculated from the following:    Height as of this encounter: 177.8 cm (70\").    Weight as of this encounter: 75 kg (165 lb 5.4 oz).    BMI is within normal parameters. No other follow-up for BMI required.      Does the patient have evidence of cognitive impairment? No    Lab Results   Component Value Date    CHLPL 156 2024    TRIG 56 2024    HDL 55 2024    LDL 89 2024    VLDL 12 2024        HEALTH RISK ASSESSMENT    Smoking Status:  Social History     Tobacco Use   Smoking Status Never   Smokeless Tobacco Never     Alcohol Consumption:  Social History     Substance and Sexual Activity   Alcohol Use Yes    Alcohol/week: 11.0 standard drinks of alcohol    Types: 3 Glasses of wine, 3 Cans of beer, 5 Shots of liquor per week    Comment: nightly     Fall Risk Screen:    HAL Fall Risk Assessment was completed, and patient is at LOW risk for falls.Assessment completed on:1/10/2024    Depression Screenin/10/2024    11:17 AM   PHQ-2/PHQ-9 Depression Screening   Little Interest or Pleasure in Doing Things 0-->not at all   Feeling Down, Depressed or Hopeless 0-->not at all   PHQ-9: Brief Depression Severity Measure Score 0       Health Habits and Functional and Cognitive Screenin/10/2024    11:15 AM   Functional & Cognitive Status   Do you have difficulty preparing food and eating? No   Do you have difficulty bathing yourself, getting dressed or grooming yourself? No   Do you have difficulty using the toilet? No   Do you have difficulty moving around from place to place? No   Do you have trouble with steps or getting out of a bed or a chair? No   Current Diet Well Balanced Diet   Dental Exam Up to date   Eye Exam Up to date   Exercise (times per week) 5 times per week   Current Exercises Include Walking;Weightlifting;Other "   Do you need help using the phone?  No   Are you deaf or do you have serious difficulty hearing?  No   Do you need help to go to places out of walking distance? No   Do you need help shopping? No   Do you need help preparing meals?  No   Do you need help with housework?  No   Do you need help with laundry? No   Do you need help taking your medications? No   Do you need help managing money? No   Do you ever drive or ride in a car without wearing a seat belt? No   Have you felt unusual stress, anger or loneliness in the last month? No   Who do you live with? Spouse   If you need help, do you have trouble finding someone available to you? No   Have you been bothered in the last four weeks by sexual problems? No   Do you have difficulty concentrating, remembering or making decisions? No       Age-appropriate Screening Schedule:  Refer to the list below for future screening recommendations based on patient's age, sex and/or medical conditions. Orders for these recommended tests are listed in the plan section. The patient has been provided with a written plan.    Health Maintenance   Topic Date Due    HEPATITIS C SCREENING  Never done    COVID-19 Vaccine (6 - 2023-24 season) 03/31/2024 (Originally 9/1/2023)    INFLUENZA VACCINE  03/31/2024 (Originally 8/1/2023)    ANNUAL WELLNESS VISIT  01/10/2025    TDAP/TD VACCINES (2 - Td or Tdap) 08/24/2029    COLORECTAL CANCER SCREENING  11/27/2033    Pneumococcal Vaccine 65+  Completed    ZOSTER VACCINE  Completed                  CMS Preventative Services Quick Reference  Risk Factors Identified During Encounter  Alcohol Misuse: Patient encouraged to limit alcohol use to no more than 1 standard alcoholic beverage per day. (12 ounce beer, 6 ounce wine, one shot liquor)  Immunizations Discussed/Encouraged: Influenza, Shingrix, and RSV (Respiratory Syncytial Virus)  Dental Screening Recommended  Vision Screening Recommended  The above risks/problems have been discussed with the  "patient.  Pertinent information has been shared with the patient in the After Visit Summary.  An After Visit Summary and PPPS were made available to the patient.    Follow Up:   Next Medicare Wellness visit to be scheduled in 1 year.       Additional E&M Note during same encounter follows:  Patient has multiple medical problems which are significant and separately identifiable that require additional work above and beyond the Medicare Wellness Visit.      Chief Complaint  Medicare Wellness-subsequent    Subjective        HPI  Matthew Serrano is also being seen today for AWV and f/u on ED on 20 mg silendafil and is now taking 5 tablets as needed for ED.   He would like a refill.  Also his labs showed worsening of RBC enlargement without anemia.  He does take b12 2x week and watches diet closely in regards to red meat.  He does drink alcohol some days 1-2 and some days 4 drinks and some days none.    He exercises regularly.  Sees eye doc yearly and has a living will at home.  Dental visits are 2x year.      SCANNED - COLONOSCOPY (11/27/2023)      TSH (01/08/2024 09:19)  Vitamin D,25-Hydroxy (01/08/2024 09:19)  CBC & Differential (01/08/2024 09:19)  Lipid Panel (01/08/2024 09:19)  Comprehensive Metabolic Panel (01/08/2024 09:19)  Objective   Vital Signs:  /68   Pulse 50   Temp 97.7 °F (36.5 °C) (Temporal)   Ht 177.8 cm (70\")   Wt 75 kg (165 lb 5.4 oz)   SpO2 99%   BMI 23.72 kg/m²     Physical Exam  Vitals and nursing note reviewed.   Constitutional:       Appearance: Normal appearance. He is well-developed.   HENT:      Head: Normocephalic and atraumatic.      Right Ear: External ear normal.      Left Ear: External ear normal.   Eyes:      Extraocular Movements: Extraocular movements intact.      Conjunctiva/sclera: Conjunctivae normal.   Neck:      Vascular: No carotid bruit.   Cardiovascular:      Rate and Rhythm: Normal rate and regular rhythm.      Heart sounds: Normal heart sounds.      Comments: No " bruits  Pulmonary:      Effort: Pulmonary effort is normal. No respiratory distress.      Breath sounds: Normal breath sounds. No stridor. No wheezing, rhonchi or rales.   Chest:      Chest wall: No tenderness.   Abdominal:      General: Bowel sounds are normal. There is no distension.      Palpations: Abdomen is soft. There is no mass.      Tenderness: There is no abdominal tenderness. There is no guarding or rebound.      Hernia: No hernia is present.   Musculoskeletal:      Cervical back: Neck supple.   Lymphadenopathy:      Cervical: No cervical adenopathy.   Skin:     General: Skin is warm.   Neurological:      Mental Status: He is alert and oriented to person, place, and time. Mental status is at baseline.   Psychiatric:         Mood and Affect: Mood normal.         Behavior: Behavior normal.         Thought Content: Thought content normal.         Judgment: Judgment normal.                         Assessment and Plan   Diagnoses and all orders for this visit:    1. Macrocytosis without anemia (Primary)  -     Comprehensive Metabolic Panel; Future  -     CBC & Differential; Future  -     Lipid Panel With LDL / HDL Ratio; Future  -     Vitamin B12; Future  -     Folate; Future  -     Folate  -     Vitamin B12    2. Vitamin D deficiency  -     Vitamin D,25-Hydroxy; Future    3. Other male erectile dysfunction  -     Comprehensive Metabolic Panel; Future  -     CBC & Differential; Future  -     Lipid Panel With LDL / HDL Ratio; Future  -     Vitamin B12; Future  -     Folate; Future    4. Family history of B12 deficiency  -     Comprehensive Metabolic Panel; Future  -     CBC & Differential; Future  -     Lipid Panel With LDL / HDL Ratio; Future  -     Vitamin B12; Future  -     Folate; Future    5. Prostate cancer screening  -     PSA Screen; Future    Other orders  -     sildenafil (REVATIO) 20 MG tablet; Take 5 tablets an hour before intercourse as needed  Dispense: 75 tablet; Refill: 3      Add on b12 and  folate due to macrocytosis w/o anemia  Add on PSA for prostate cancer screening  Refill sildenafil for ED  Fasting labs ordered for next year  We discussed the risks of alcohol and affects of alcohol on bone marrow.  He will work on decreasing alcohol intake and frequency.          Follow Up   Return in about 1 year (around 1/10/2025) for Medicare Wellness.  Patient was given instructions and counseling regarding his condition or for health maintenance advice. Please see specific information pulled into the AVS if appropriate.

## 2024-01-18 NOTE — TELEPHONE ENCOUNTER
PATIENTS WIFE CALLED IN AND STATED THAT THE [facility] NEVER RECEIVED THE PRESCRIPTION ON 01-10-24.    CAN SOMEONE PLEASE REACH OUT TO PHARMACY TO SEE WHAT THE ISSUE IS?

## 2024-01-19 ENCOUNTER — LAB (OUTPATIENT)
Dept: LAB | Facility: HOSPITAL | Age: 74
End: 2024-01-19
Payer: MEDICARE

## 2024-01-19 DIAGNOSIS — E55.9 VITAMIN D DEFICIENCY: ICD-10-CM

## 2024-01-19 DIAGNOSIS — D75.89 MACROCYTOSIS WITHOUT ANEMIA: ICD-10-CM

## 2024-01-19 DIAGNOSIS — N52.8 OTHER MALE ERECTILE DYSFUNCTION: ICD-10-CM

## 2024-01-19 DIAGNOSIS — Z83.49 FAMILY HISTORY OF B12 DEFICIENCY: ICD-10-CM

## 2024-01-19 LAB
25(OH)D3 SERPL-MCNC: 37.2 NG/ML (ref 30–100)
ALBUMIN SERPL-MCNC: 4.2 G/DL (ref 3.5–5.2)
ALBUMIN/GLOB SERPL: 1.5 G/DL
ALP SERPL-CCNC: 53 U/L (ref 39–117)
ALT SERPL W P-5'-P-CCNC: 13 U/L (ref 1–41)
ANION GAP SERPL CALCULATED.3IONS-SCNC: 5.9 MMOL/L (ref 5–15)
AST SERPL-CCNC: 20 U/L (ref 1–40)
BASOPHILS # BLD AUTO: 0.03 10*3/MM3 (ref 0–0.2)
BASOPHILS NFR BLD AUTO: 0.5 % (ref 0–1.5)
BILIRUB SERPL-MCNC: 0.6 MG/DL (ref 0–1.2)
BUN SERPL-MCNC: 15 MG/DL (ref 8–23)
BUN/CREAT SERPL: 16 (ref 7–25)
CALCIUM SPEC-SCNC: 9.4 MG/DL (ref 8.6–10.5)
CHLORIDE SERPL-SCNC: 103 MMOL/L (ref 98–107)
CHOLEST SERPL-MCNC: 150 MG/DL (ref 0–200)
CO2 SERPL-SCNC: 28.1 MMOL/L (ref 22–29)
CREAT SERPL-MCNC: 0.94 MG/DL (ref 0.76–1.27)
DEPRECATED RDW RBC AUTO: 44.9 FL (ref 37–54)
EGFRCR SERPLBLD CKD-EPI 2021: 86.7 ML/MIN/1.73
EOSINOPHIL # BLD AUTO: 0.12 10*3/MM3 (ref 0–0.4)
EOSINOPHIL NFR BLD AUTO: 1.9 % (ref 0.3–6.2)
ERYTHROCYTE [DISTWIDTH] IN BLOOD BY AUTOMATED COUNT: 12.5 % (ref 12.3–15.4)
FOLATE SERPL-MCNC: >20 NG/ML (ref 4.78–24.2)
GLOBULIN UR ELPH-MCNC: 2.8 GM/DL
GLUCOSE SERPL-MCNC: 95 MG/DL (ref 65–99)
HCT VFR BLD AUTO: 40.8 % (ref 37.5–51)
HDLC SERPL-MCNC: 48 MG/DL (ref 40–60)
HGB BLD-MCNC: 14.1 G/DL (ref 13–17.7)
IMM GRANULOCYTES # BLD AUTO: 0.02 10*3/MM3 (ref 0–0.05)
IMM GRANULOCYTES NFR BLD AUTO: 0.3 % (ref 0–0.5)
LDLC SERPL CALC-MCNC: 88 MG/DL (ref 0–100)
LDLC/HDLC SERPL: 1.82 {RATIO}
LYMPHOCYTES # BLD AUTO: 1.62 10*3/MM3 (ref 0.7–3.1)
LYMPHOCYTES NFR BLD AUTO: 25.3 % (ref 19.6–45.3)
MCH RBC QN AUTO: 33.7 PG (ref 26.6–33)
MCHC RBC AUTO-ENTMCNC: 34.6 G/DL (ref 31.5–35.7)
MCV RBC AUTO: 97.4 FL (ref 79–97)
MONOCYTES # BLD AUTO: 0.42 10*3/MM3 (ref 0.1–0.9)
MONOCYTES NFR BLD AUTO: 6.6 % (ref 5–12)
NEUTROPHILS NFR BLD AUTO: 4.2 10*3/MM3 (ref 1.7–7)
NEUTROPHILS NFR BLD AUTO: 65.4 % (ref 42.7–76)
NRBC BLD AUTO-RTO: 0 /100 WBC (ref 0–0.2)
PLATELET # BLD AUTO: 183 10*3/MM3 (ref 140–450)
PMV BLD AUTO: 8.9 FL (ref 6–12)
POTASSIUM SERPL-SCNC: 4.8 MMOL/L (ref 3.5–5.2)
PROT SERPL-MCNC: 7 G/DL (ref 6–8.5)
PSA SERPL-MCNC: 0.93 NG/ML (ref 0–4)
RBC # BLD AUTO: 4.19 10*6/MM3 (ref 4.14–5.8)
SODIUM SERPL-SCNC: 137 MMOL/L (ref 136–145)
TRIGL SERPL-MCNC: 73 MG/DL (ref 0–150)
VIT B12 BLD-MCNC: 661 PG/ML (ref 211–946)
VLDLC SERPL-MCNC: 14 MG/DL (ref 5–40)
WBC NRBC COR # BLD AUTO: 6.41 10*3/MM3 (ref 3.4–10.8)

## 2024-01-19 PROCEDURE — 82746 ASSAY OF FOLIC ACID SERUM: CPT

## 2024-01-19 PROCEDURE — 82607 VITAMIN B-12: CPT

## 2024-01-19 PROCEDURE — 80053 COMPREHEN METABOLIC PANEL: CPT

## 2024-01-19 PROCEDURE — 36415 COLL VENOUS BLD VENIPUNCTURE: CPT

## 2024-01-19 PROCEDURE — 85025 COMPLETE CBC W/AUTO DIFF WBC: CPT

## 2024-01-19 PROCEDURE — 80061 LIPID PANEL: CPT

## 2024-01-19 PROCEDURE — G0103 PSA SCREENING: HCPCS | Performed by: INTERNAL MEDICINE

## 2024-01-19 PROCEDURE — 82306 VITAMIN D 25 HYDROXY: CPT

## 2024-01-19 RX ORDER — SILDENAFIL CITRATE 20 MG/1
TABLET ORAL
Qty: 60 TABLET | Refills: 0 | Status: SHIPPED | OUTPATIENT
Start: 2024-01-19

## 2024-04-29 RX ORDER — SILDENAFIL CITRATE 20 MG/1
TABLET ORAL
Qty: 60 TABLET | Refills: 0 | Status: SHIPPED | OUTPATIENT
Start: 2024-04-29

## 2024-04-29 NOTE — TELEPHONE ENCOUNTER
Rx Refill Note  Requested Prescriptions     Pending Prescriptions Disp Refills    sildenafil (REVATIO) 20 MG tablet [Pharmacy Med Name: SILDENAFIL 20 MG TABLET] 60 tablet 0     Sig: TAKE ONE TO FIVE TABLETS BY MOUTH AS NEEDED PRIOR TO INTERCOURSE - MAX DAILY DOSE OF 5 TABLETS IN 24 HOURS      Last office visit with prescribing clinician: 1/10/2024   Last telemedicine visit with prescribing clinician: Visit date not found   Next office visit with prescribing clinician: 1/14/2025                         Would you like a call back once the refill request has been completed: [] Yes [] No    If the office needs to give you a call back, can they leave a voicemail: [] Yes [] No    Maty Moser MA  04/29/24, 10:10 EDT

## 2024-09-30 NOTE — TELEPHONE ENCOUNTER
Rx Refill Note  Requested Prescriptions     Pending Prescriptions Disp Refills    sildenafil (REVATIO) 20 MG tablet [Pharmacy Med Name: SILDENAFIL 20 MG TABLET] 60 tablet 0     Sig: TAKE ONE TO FIVE TABLETS BY MOUTH AS NEEDED PRIOR TO INTERCOURSE - MAX DAILY DOSE OF 5 TABLETS IN 24 HOURS      Last office visit with prescribing clinician: 1/10/2024   Last telemedicine visit with prescribing clinician: Visit date not found   Next office visit with prescribing clinician: 1/14/2025                         Would you like a call back once the refill request has been completed: [] Yes [] No    If the office needs to give you a call back, can they leave a voicemail: [] Yes [] No    Olegario Hayes MA  09/30/24, 08:02 EDT

## 2024-10-01 RX ORDER — SILDENAFIL CITRATE 20 MG/1
TABLET ORAL
Qty: 60 TABLET | Refills: 0 | Status: SHIPPED | OUTPATIENT
Start: 2024-10-01

## 2025-01-03 ENCOUNTER — TELEPHONE (OUTPATIENT)
Dept: FAMILY MEDICINE CLINIC | Facility: CLINIC | Age: 75
End: 2025-01-03
Payer: MEDICARE

## 2025-01-03 DIAGNOSIS — Z00.00 WELLNESS EXAMINATION: ICD-10-CM

## 2025-01-03 DIAGNOSIS — R53.83 FATIGUE, UNSPECIFIED TYPE: ICD-10-CM

## 2025-01-03 DIAGNOSIS — Z12.5 PROSTATE CANCER SCREENING: ICD-10-CM

## 2025-01-03 DIAGNOSIS — N52.8 OTHER MALE ERECTILE DYSFUNCTION: ICD-10-CM

## 2025-01-03 DIAGNOSIS — Z83.49 FAMILY HISTORY OF B12 DEFICIENCY: ICD-10-CM

## 2025-01-03 DIAGNOSIS — D75.89 MACROCYTOSIS WITHOUT ANEMIA: ICD-10-CM

## 2025-01-03 DIAGNOSIS — E55.9 VITAMIN D DEFICIENCY: Primary | ICD-10-CM

## 2025-01-08 DIAGNOSIS — R53.83 FATIGUE, UNSPECIFIED TYPE: ICD-10-CM

## 2025-01-08 DIAGNOSIS — Z83.49 FAMILY HISTORY OF B12 DEFICIENCY: ICD-10-CM

## 2025-01-08 DIAGNOSIS — Z12.5 PROSTATE CANCER SCREENING: ICD-10-CM

## 2025-01-08 DIAGNOSIS — Z00.00 WELLNESS EXAMINATION: ICD-10-CM

## 2025-01-08 DIAGNOSIS — N52.8 OTHER MALE ERECTILE DYSFUNCTION: ICD-10-CM

## 2025-01-08 DIAGNOSIS — E55.9 VITAMIN D DEFICIENCY: ICD-10-CM

## 2025-01-08 DIAGNOSIS — D75.89 MACROCYTOSIS WITHOUT ANEMIA: ICD-10-CM

## 2025-01-09 ENCOUNTER — LAB (OUTPATIENT)
Dept: FAMILY MEDICINE CLINIC | Facility: CLINIC | Age: 75
End: 2025-01-09
Payer: MEDICARE

## 2025-01-09 DIAGNOSIS — Z11.59 NEED FOR HEPATITIS C SCREENING TEST: ICD-10-CM

## 2025-01-09 PROBLEM — M19.90 ARTHRITIS: Status: ACTIVE | Noted: 2025-01-09

## 2025-01-09 PROBLEM — L03.90 CELLULITIS: Status: ACTIVE | Noted: 2025-01-09

## 2025-01-09 PROBLEM — S61.412A LACERATION WITHOUT FOREIGN BODY OF LEFT HAND, INITIAL ENCOUNTER: Status: ACTIVE | Noted: 2025-01-09

## 2025-01-09 PROBLEM — M25.519 PAIN IN SHOULDER: Status: ACTIVE | Noted: 2025-01-09

## 2025-01-09 PROBLEM — E66.3 EXCESS WEIGHT: Status: ACTIVE | Noted: 2025-01-09

## 2025-01-09 PROBLEM — K57.30 DVRTCLOS OF LG INT W/O PERFORATION OR ABSCESS W/O BLEEDING: Status: ACTIVE | Noted: 2018-11-13

## 2025-01-09 PROBLEM — K63.5 COLON POLYPS: Status: ACTIVE | Noted: 2025-01-09

## 2025-01-09 PROBLEM — Z86.0100 HISTORY OF COLONIC POLYPS: Status: ACTIVE | Noted: 2018-11-13

## 2025-01-10 LAB
25(OH)D3+25(OH)D2 SERPL-MCNC: 48 NG/ML (ref 30–100)
ALBUMIN SERPL-MCNC: 4.1 G/DL (ref 3.8–4.8)
ALP SERPL-CCNC: 63 IU/L (ref 44–121)
ALT SERPL-CCNC: 15 IU/L (ref 0–44)
APPEARANCE UR: CLEAR
AST SERPL-CCNC: 17 IU/L (ref 0–40)
BACTERIA #/AREA URNS HPF: NORMAL /[HPF]
BASOPHILS # BLD AUTO: 0 X10E3/UL (ref 0–0.2)
BASOPHILS NFR BLD AUTO: 1 %
BILIRUB SERPL-MCNC: 0.8 MG/DL (ref 0–1.2)
BILIRUB UR QL STRIP: NEGATIVE
BUN SERPL-MCNC: 12 MG/DL (ref 8–27)
BUN/CREAT SERPL: 10 (ref 10–24)
CALCIUM SERPL-MCNC: 9 MG/DL (ref 8.6–10.2)
CASTS URNS QL MICRO: NORMAL /LPF
CHLORIDE SERPL-SCNC: 99 MMOL/L (ref 96–106)
CHOLEST SERPL-MCNC: 136 MG/DL (ref 100–199)
CO2 SERPL-SCNC: 22 MMOL/L (ref 20–29)
COLOR UR: YELLOW
CREAT SERPL-MCNC: 1.19 MG/DL (ref 0.76–1.27)
EGFRCR SERPLBLD CKD-EPI 2021: 65 ML/MIN/1.73
EOSINOPHIL # BLD AUTO: 0.1 X10E3/UL (ref 0–0.4)
EOSINOPHIL NFR BLD AUTO: 2 %
EPI CELLS #/AREA URNS HPF: NORMAL /HPF (ref 0–10)
ERYTHROCYTE [DISTWIDTH] IN BLOOD BY AUTOMATED COUNT: 11.9 % (ref 11.6–15.4)
GLOBULIN SER CALC-MCNC: 2.5 G/DL (ref 1.5–4.5)
GLUCOSE SERPL-MCNC: 83 MG/DL (ref 70–99)
GLUCOSE UR QL STRIP: NEGATIVE
HCT VFR BLD AUTO: 42.5 % (ref 37.5–51)
HCV IGG SERPL QL IA: NON REACTIVE
HDLC SERPL-MCNC: 49 MG/DL
HGB BLD-MCNC: 14.7 G/DL (ref 13–17.7)
HGB UR QL STRIP: NEGATIVE
IMM GRANULOCYTES # BLD AUTO: 0 X10E3/UL (ref 0–0.1)
IMM GRANULOCYTES NFR BLD AUTO: 0 %
KETONES UR QL STRIP: NEGATIVE
LDLC SERPL CALC-MCNC: 75 MG/DL (ref 0–99)
LDLC/HDLC SERPL: 1.5 RATIO (ref 0–3.6)
LEUKOCYTE ESTERASE UR QL STRIP: NEGATIVE
LYMPHOCYTES # BLD AUTO: 1.4 X10E3/UL (ref 0.7–3.1)
LYMPHOCYTES NFR BLD AUTO: 21 %
MCH RBC QN AUTO: 34.1 PG (ref 26.6–33)
MCHC RBC AUTO-ENTMCNC: 34.6 G/DL (ref 31.5–35.7)
MCV RBC AUTO: 99 FL (ref 79–97)
MICRO URNS: NORMAL
MICRO URNS: NORMAL
MONOCYTES # BLD AUTO: 0.5 X10E3/UL (ref 0.1–0.9)
MONOCYTES NFR BLD AUTO: 7 %
NEUTROPHILS # BLD AUTO: 4.8 X10E3/UL (ref 1.4–7)
NEUTROPHILS NFR BLD AUTO: 69 %
NITRITE UR QL STRIP: NEGATIVE
PH UR STRIP: 6 [PH] (ref 5–7.5)
PLATELET # BLD AUTO: 227 X10E3/UL (ref 150–450)
POTASSIUM SERPL-SCNC: 4.8 MMOL/L (ref 3.5–5.2)
PROT SERPL-MCNC: 6.6 G/DL (ref 6–8.5)
PROT UR QL STRIP: NEGATIVE
PSA SERPL-MCNC: 1.1 NG/ML (ref 0–4)
RBC # BLD AUTO: 4.31 X10E6/UL (ref 4.14–5.8)
RBC #/AREA URNS HPF: NORMAL /HPF (ref 0–2)
SODIUM SERPL-SCNC: 134 MMOL/L (ref 134–144)
SP GR UR STRIP: 1.01 (ref 1–1.03)
T4 FREE SERPL-MCNC: 1.45 NG/DL (ref 0.82–1.77)
TRIGL SERPL-MCNC: 58 MG/DL (ref 0–149)
TSH SERPL DL<=0.005 MIU/L-ACNC: 1.73 UIU/ML (ref 0.45–4.5)
URINALYSIS REFLEX: NORMAL
UROBILINOGEN UR STRIP-MCNC: 0.2 MG/DL (ref 0.2–1)
VIT B12 SERPL-MCNC: 907 PG/ML (ref 232–1245)
VLDLC SERPL CALC-MCNC: 12 MG/DL (ref 5–40)
WBC # BLD AUTO: 6.9 X10E3/UL (ref 3.4–10.8)
WBC #/AREA URNS HPF: NORMAL /HPF (ref 0–5)

## 2025-01-14 ENCOUNTER — OFFICE VISIT (OUTPATIENT)
Dept: FAMILY MEDICINE CLINIC | Facility: CLINIC | Age: 75
End: 2025-01-14
Payer: MEDICARE

## 2025-01-14 VITALS
SYSTOLIC BLOOD PRESSURE: 110 MMHG | HEART RATE: 56 BPM | HEIGHT: 70 IN | WEIGHT: 163.2 LBS | DIASTOLIC BLOOD PRESSURE: 66 MMHG | OXYGEN SATURATION: 98 % | BODY MASS INDEX: 23.37 KG/M2

## 2025-01-14 DIAGNOSIS — Z00.00 WELLNESS EXAMINATION: ICD-10-CM

## 2025-01-14 DIAGNOSIS — Z12.5 PROSTATE CANCER SCREENING: ICD-10-CM

## 2025-01-14 DIAGNOSIS — E55.9 VITAMIN D DEFICIENCY: ICD-10-CM

## 2025-01-14 DIAGNOSIS — D75.89 MACROCYTOSIS WITHOUT ANEMIA: Primary | ICD-10-CM

## 2025-01-14 DIAGNOSIS — N52.8 OTHER MALE ERECTILE DYSFUNCTION: ICD-10-CM

## 2025-01-14 PROCEDURE — 1160F RVW MEDS BY RX/DR IN RCRD: CPT | Performed by: INTERNAL MEDICINE

## 2025-01-14 PROCEDURE — 1126F AMNT PAIN NOTED NONE PRSNT: CPT | Performed by: INTERNAL MEDICINE

## 2025-01-14 PROCEDURE — 1170F FXNL STATUS ASSESSED: CPT | Performed by: INTERNAL MEDICINE

## 2025-01-14 PROCEDURE — 99213 OFFICE O/P EST LOW 20 MIN: CPT | Performed by: INTERNAL MEDICINE

## 2025-01-14 PROCEDURE — 1159F MED LIST DOCD IN RCRD: CPT | Performed by: INTERNAL MEDICINE

## 2025-01-14 PROCEDURE — G0439 PPPS, SUBSEQ VISIT: HCPCS | Performed by: INTERNAL MEDICINE

## 2025-01-14 RX ORDER — SILDENAFIL CITRATE 20 MG/1
TABLET ORAL
Qty: 60 TABLET | Refills: 4 | Status: SHIPPED | OUTPATIENT
Start: 2025-01-14

## 2025-01-14 NOTE — ASSESSMENT & PLAN NOTE
Orders:    CBC & Differential; Future    Comprehensive Metabolic Panel; Future    Lipid Panel With LDL / HDL Ratio; Future    PSA Screen; Future    Vitamin B12; Future    Folate; Future

## 2025-01-14 NOTE — PROGRESS NOTES
Subjective   The ABCs of the Annual Wellness Visit  Medicare Wellness Visit      Matthew Serrano is a 74 y.o. patient who presents for a Medicare Wellness Visit.    The following portions of the patient's history were reviewed and   updated as appropriate: allergies, current medications, past family history, past medical history, past social history, past surgical history, and problem list.    Compared to one year ago, the patient's physical   health is the same.  Compared to one year ago, the patient's mental   health is the same.    Recent Hospitalizations:  He was not admitted to the hospital during the last year.     Current Medical Providers:  Patient Care Team:  Cecelia Davis MD as PCP - General (Internal Medicine)    Outpatient Medications Prior to Visit   Medication Sig Dispense Refill    Glucosamine-Chondroitin 750-600 MG tablet Take  by mouth.      Multiple Vitamins-Minerals (MULTI COMPLETE PO) Take  by mouth.      vitamin B-12 (CYANOCOBALAMIN) 1000 MCG tablet Take 1 tablet by mouth Daily.      vitamin D3 125 MCG (5000 UT) capsule capsule Take 1 capsule by mouth Daily.      sildenafil (REVATIO) 20 MG tablet TAKE ONE TO FIVE TABLETS BY MOUTH AS NEEDED PRIOR TO INTERCOURSE - MAX DAILY DOSE OF 5 TABLETS IN 24 HOURS 60 tablet 0     No facility-administered medications prior to visit.     No opioid medication identified on active medication list. I have reviewed chart for other potential  high risk medication/s and harmful drug interactions in the elderly.      Aspirin is not on active medication list.  Aspirin use is not indicated based on review of current medical condition/s. Risk of harm outweighs potential benefits.  .    Patient Active Problem List   Diagnosis    Wellness examination    Other male erectile dysfunction    Vitamin D deficiency    Submandibular gland mass    Arthritis    Cellulitis    Colon polyps    Dvrtclos of lg int w/o perforation or abscess w/o bleeding    Excess weight    History  "of colonic polyps    Laceration without foreign body of left hand, initial encounter    Pain in shoulder     Advance Care Planning Advance Directive is not on file.  ACP discussion was held with the patient during this visit. Patient has an advance directive (not in EMR), copy requested.            Objective   Vitals:    25 0952   BP: 110/66   Pulse: 56   SpO2: 98%   Weight: 74 kg (163 lb 3.2 oz)   Height: 177.8 cm (70\")   PainSc: 0-No pain       Estimated body mass index is 23.42 kg/m² as calculated from the following:    Height as of this encounter: 177.8 cm (70\").    Weight as of this encounter: 74 kg (163 lb 3.2 oz).    BMI is within normal parameters. No other follow-up for BMI required.           Does the patient have evidence of cognitive impairment? No  Lab Results   Component Value Date    CHLPL 136 2025    TRIG 58 2025    HDL 49 2025    LDL 75 2025    VLDL 12 2025                                                                                                Health  Risk Assessment    Smoking Status:  Social History     Tobacco Use   Smoking Status Never   Smokeless Tobacco Never     Alcohol Consumption:  Social History     Substance and Sexual Activity   Alcohol Use Yes    Alcohol/week: 11.0 standard drinks of alcohol    Types: 3 Glasses of wine, 3 Cans of beer, 5 Shots of liquor per week    Comment: nightly       Fall Risk Screen  ALIVIAADI Fall Risk Assessment was completed, and patient is at LOW risk for falls.Assessment completed on:2025    Depression Screening   Little interest or pleasure in doing things? Not at all   Feeling down, depressed, or hopeless? Not at all   PHQ-2 Total Score 0      Health Habits and Functional and Cognitive Screenin/14/2025     9:00 AM   Functional & Cognitive Status   Do you have difficulty preparing food and eating? No   Do you have difficulty bathing yourself, getting dressed or grooming yourself? No   Do you have " difficulty using the toilet? No   Do you have difficulty moving around from place to place? No   Do you have trouble with steps or getting out of a bed or a chair? No   Current Diet Well Balanced Diet   Dental Exam Up to date   Eye Exam Up to date   Exercise (times per week) 2 times per week   Current Exercises Include Walking;Light Weights   Do you need help using the phone?  No   Are you deaf or do you have serious difficulty hearing?  Yes   Do you need help to go to places out of walking distance? No   Do you need help shopping? No   Do you need help preparing meals?  No   Do you need help with housework?  No   Do you need help with laundry? No   Do you need help taking your medications? No   Do you need help managing money? No   Do you ever drive or ride in a car without wearing a seat belt? No   Have you felt unusual stress, anger or loneliness in the last month? No   Who do you live with? Spouse   If you need help, do you have trouble finding someone available to you? No   Have you been bothered in the last four weeks by sexual problems? No   Do you have difficulty concentrating, remembering or making decisions? No           Age-appropriate Screening Schedule:  Refer to the list below for future screening recommendations based on patient's age, sex and/or medical conditions. Orders for these recommended tests are listed in the plan section. The patient has been provided with a written plan.    Health Maintenance List  Health Maintenance   Topic Date Due    INFLUENZA VACCINE  Never done    COVID-19 Vaccine (6 - 2024-25 season) 09/01/2024    ANNUAL WELLNESS VISIT  01/10/2025    COLORECTAL CANCER SCREENING  11/27/2033    TDAP/TD VACCINES (3 - Td or Tdap) 06/20/2034    HEPATITIS C SCREENING  Completed    Pneumococcal Vaccine 65+  Completed    ZOSTER VACCINE  Completed                                                                                                                                                CMS  "Preventative Services Quick Reference  Risk Factors Identified During Encounter  Hearing Problem:  he had hearing aid and didn't tolerate them  Immunizations Discussed/Encouraged:  up to date other than flu shot  Dental Screening Recommended  Vision Screening Recommended    The above risks/problems have been discussed with the patient.  Pertinent information has been shared with the patient in the After Visit Summary.  An After Visit Summary and PPPS were made available to the patient.    Follow Up:   Next Medicare Wellness visit to be scheduled in 1 year.         Additional E&M Note during same encounter follows:  Patient has additional, significant, and separately identifiable condition(s)/problem(s) that require work above and beyond the Medicare Wellness Visit     Chief Complaint  Medicare Wellness-subsequent (Discuss labs)    Subjective   HPI  Matthew Serrano is also being seen today for additional medical problem/s.  He has ED and takes 20 mg sildenafil 5 tabs before intercourse about once a week.  He needs a refill today.  No other prescriptions.  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  .  Doing well.  He has decreased alcohol intake over the past few years.  Has between 0-3 drinks per night and typically 1-2 drinks per night.         Objective   Vital Signs:  /66   Pulse 56   Ht 177.8 cm (70\")   Wt 74 kg (163 lb 3.2 oz)   SpO2 98%   BMI 23.42 kg/m²   Physical Exam  Vitals and nursing note reviewed.   Constitutional:       Appearance: Normal appearance. He is well-developed.   HENT:      Head: Normocephalic and atraumatic.      Right Ear: Tympanic membrane, ear canal and external ear normal.      Left Ear: Tympanic membrane, ear canal and external ear normal.      Mouth/Throat:      Mouth: Mucous membranes are moist.   Eyes:      Extraocular Movements: Extraocular movements intact.      Conjunctiva/sclera: Conjunctivae normal.   Neck:      Vascular: No carotid bruit. "   Cardiovascular:      Rate and Rhythm: Normal rate and regular rhythm.      Heart sounds: Normal heart sounds.      Comments: No bruits  Pulmonary:      Effort: Pulmonary effort is normal. No respiratory distress.      Breath sounds: Normal breath sounds. No stridor. No wheezing, rhonchi or rales.   Chest:      Chest wall: No tenderness.   Abdominal:      General: Bowel sounds are normal. There is no distension.      Palpations: Abdomen is soft. There is no mass.      Tenderness: There is no abdominal tenderness. There is no guarding or rebound.      Hernia: No hernia is present.   Musculoskeletal:      Cervical back: Neck supple.      Right lower leg: No edema.      Left lower leg: No edema.   Lymphadenopathy:      Cervical: No cervical adenopathy.   Skin:     General: Skin is warm.   Neurological:      General: No focal deficit present.      Mental Status: He is alert and oriented to person, place, and time. Mental status is at baseline.   Psychiatric:         Mood and Affect: Mood normal.         Behavior: Behavior normal.         Thought Content: Thought content normal.         Judgment: Judgment normal.               PSA Screen (01/09/2025 10:32)CBC w AUTO Differential (01/09/2025 10:32)  Microscopic Examination - (01/09/2025 10:32)  Hepatitis C Antibody (01/09/2025 10:32)  Vitamin B12 (01/09/2025 10:32)  Vitamin D,25-Hydroxy (01/09/2025 10:32)  Urinalysis With Culture If Indicated - (01/09/2025 10:32)  Lipid Panel With LDL/HDL Ratio (01/09/2025 10:32)  T4, free (01/09/2025 10:32)  TSH (01/09/2025 10:32)  Comprehensive metabolic panel (01/09/2025 10:32)        Assessment and Plan Additional age appropriate preventative wellness advice topics were discussed during today's preventative wellness exam(some topics already addressed during AWV portion of the note above):   Alcohol Misuse Discussion: Information shared in after visit summary.           Macrocytosis without anemia    Orders:    CBC & Differential;  Future    Comprehensive Metabolic Panel; Future    Lipid Panel With LDL / HDL Ratio; Future    PSA Screen; Future    Vitamin B12; Future    Folate; Future    Prostate cancer screening    Orders:    CBC & Differential; Future    Comprehensive Metabolic Panel; Future    Lipid Panel With LDL / HDL Ratio; Future    PSA Screen; Future    Vitamin B12; Future    Folate; Future    Vitamin D deficiency    Orders:    CBC & Differential; Future    Comprehensive Metabolic Panel; Future    Lipid Panel With LDL / HDL Ratio; Future    PSA Screen; Future    Vitamin B12; Future    Folate; Future    Vitamin D,25-Hydroxy; Future    Wellness examination    Orders:    CBC & Differential; Future    Comprehensive Metabolic Panel; Future    Lipid Panel With LDL / HDL Ratio; Future    PSA Screen; Future    Vitamin B12; Future    Folate; Future    Other male erectile dysfunction    Orders:    CBC & Differential; Future    Comprehensive Metabolic Panel; Future    Lipid Panel With LDL / HDL Ratio; Future    PSA Screen; Future    Vitamin B12; Future    Folate; Future    Patient is doing very well.  Encouraged patient to decrease alcohol intake further to see if that would help his macrocytic changes.  Thankfully, there is no anemia or other cell line cytopenias.  Will continue to monitor this with yearly blood work.  His B12 and folic acid have been normal in the past.  Will also check that in 1 year as well as his vitamin D level because he did have vitamin D deficiency in the past.  Will follow his cholesterol but this year's levels were normal.  PSA normal and recheck in 1 year.  Refill provided for his sildenafil due to ED.  Adult wellness exam completed.  Vaccinations reviewed.  Preventative calcium provided.       Follow Up   No follow-ups on file.  Patient was given instructions and counseling regarding his condition or for health maintenance advice. Please see specific information pulled into the AVS if appropriate.

## 2025-01-14 NOTE — ASSESSMENT & PLAN NOTE
Orders:    CBC & Differential; Future    Comprehensive Metabolic Panel; Future    Lipid Panel With LDL / HDL Ratio; Future    PSA Screen; Future    Vitamin B12; Future    Folate; Future    Vitamin D,25-Hydroxy; Future

## 2025-02-11 ENCOUNTER — OFFICE VISIT (OUTPATIENT)
Dept: FAMILY MEDICINE CLINIC | Facility: CLINIC | Age: 75
End: 2025-02-11
Payer: MEDICARE

## 2025-02-11 VITALS
DIASTOLIC BLOOD PRESSURE: 64 MMHG | SYSTOLIC BLOOD PRESSURE: 122 MMHG | WEIGHT: 163 LBS | OXYGEN SATURATION: 99 % | HEART RATE: 58 BPM | BODY MASS INDEX: 23.34 KG/M2 | HEIGHT: 70 IN

## 2025-02-11 DIAGNOSIS — R55 SYNCOPE, UNSPECIFIED SYNCOPE TYPE: Primary | ICD-10-CM

## 2025-02-11 PROCEDURE — 1111F DSCHRG MED/CURRENT MED MERGE: CPT | Performed by: NURSE PRACTITIONER

## 2025-02-11 PROCEDURE — 1126F AMNT PAIN NOTED NONE PRSNT: CPT | Performed by: NURSE PRACTITIONER

## 2025-02-11 PROCEDURE — 99495 TRANSJ CARE MGMT MOD F2F 14D: CPT | Performed by: NURSE PRACTITIONER

## 2025-02-19 NOTE — PROGRESS NOTES
Chief Complaint  Hospital Follow Up Visit    Subjective        Matthew Serrano presents to [facility]  History of Present Illness  History of Present Illness  The patient is a 74-year-old male who presents for evaluation of syncope.    Date of admission: 02/02/2025  Date of discharge: 02/03/2025    He experienced an episode of syncope while boarding a  flight back from [place], where he had been residing for 12 days. The incident occurred after he lifted a 30-pound suitcase, resulting in lightheadedness and subsequent loss of consciousness. He reports no associated symptoms such as sweating or chest pain. He speculates that the episode may have been precipitated by a mild illness and dehydration, possibly due to alcohol consumption the previous night. Following the incident, he was transported to [facility] in [place], where he was observed overnight and subjected to various diagnostic tests. He has brought all the paperwork from the hospital for review. He was initiated on aspirin therapy during his hospital stay. He has a family history of cardiac valve disease, with both his mother and uncle affected. He was referred to a cardiologist by Dr. Davis, who conducted a stress test and other evaluations approximately 3 to 4 years ago, concluding that he was in good cardiac health. He reports no history of smoking. He received intravenous fluids during his hospital stay and has not experienced any further episodes of dizziness or lightheadedness since returning home. He reports feeling unwell for the initial 3 to 4 days post-discharge but has been feeling well since last Thursday. He is scheduled to travel to [place] on Saturday for a 3-week trip. He reports no history of palpitations. He also reports a tendency towards low blood pressure, pulse, and temperature. He is not currently on any antihypertensive medications.    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 He maintains an active lifestyle, including frequent stair climbing. His current medication regimen includes sildenafil.    SOCIAL HISTORY  He does not smoke. He admits to drinking alcohol.    FAMILY HISTORY  His mother and her brother had heart valve issues.    MEDICATIONS  Current: Sildenafil.    Objective   Vital Signs:  /64 (BP Location: Right arm, Patient Position: Sitting, Cuff Size: Adult)   Pulse 58   Ht 177.8 cm (70\")   Wt 73.9 kg (163 lb)   SpO2 99%   BMI 23.39 kg/m²   Estimated body mass index is 23.39 kg/m² as calculated from the following:    Height as of this encounter: 177.8 cm (70\").    Weight as of this encounter: 73.9 kg (163 lb).       BMI is within normal parameters. No other follow-up for BMI required.      Physical Exam  Vitals reviewed.   Constitutional:       General: He is not in acute distress.     Appearance: He is well-developed. He is not diaphoretic.   HENT:      Head: Normocephalic and atraumatic.      Right Ear: Tympanic membrane, ear canal and external ear normal.      Left Ear: Tympanic membrane, ear canal and external ear normal.      Nose: Nose normal.      Mouth/Throat:      Mouth: Mucous membranes are moist.      Pharynx: Oropharynx is clear. Uvula midline. No oropharyngeal exudate.   Eyes:      Conjunctiva/sclera: Conjunctivae normal.      Pupils: Pupils are equal, round, and reactive to light.   Cardiovascular:      Rate and Rhythm: Normal rate and regular rhythm.      Heart sounds: Normal heart sounds. No murmur heard.     No friction rub. No gallop.   Pulmonary:      Effort: Pulmonary effort is normal. No respiratory distress.      Breath sounds: Normal breath sounds. No wheezing or rales.   Abdominal:      General: Bowel sounds are normal. There is no distension.      Palpations: Abdomen is soft.      Tenderness: There is no abdominal tenderness.   Musculoskeletal:      " Cervical back: Neck supple.   Lymphadenopathy:      Cervical: No cervical adenopathy.   Skin:     General: Skin is warm and dry.   Neurological:      Mental Status: He is alert and oriented to person, place, and time.   Psychiatric:         Mood and Affect: Mood normal.       Physical Exam  Oral exam was performed.  There is a little gland swollen in the neck.  Lungs were auscultated.    Vital Signs  Blood pressure is 122/64. Heart rate is 58.    Result Review :          Results  Laboratory Studies  White count was normal. Sodium was a little low. Kidney function looked good. Magnesium was normal but on the low side of normal. Troponins were all normal.    Imaging  Ultrasound of the carotids showed 50 to 69% stenosis on one side. CT of the head was normal. Chest imaging was normal. Echocardiogram showed normal ejection fraction (60 to 65%), normal heart structures, and a little bit of regurgitation.    Testing  EKG showed sinus rhythm with no abnormalities.    Assessment & Plan  1. Syncope.  The patient experienced a syncopal episode likely due to a combination of factors including dehydration from alcohol consumption the night before, fever, and a potential viral illness. His blood pressure today is 122/64, and his heart rate is 58, which are within normal limits. His blood work, including white count, sodium, kidney function, magnesium, and troponins, were all normal. A carotid ultrasound showed 50-69% stenosis on one side, which is considered low to moderate. A CT of the head and chest were normal, and an echocardiogram showed a normal ejection fraction of 60-65% with no significant regurgitation. An EKG showed sinus rhythm with no abnormalities. He was started on aspirin as a precautionary measure. A referral to Dr. Ta, a cardiologist, has been made for further evaluation and management. A heart monitor will be ordered and scheduled for when he returns from his trip to [place].           Assessment and  Plan     Diagnoses and all orders for this visit:    1. Syncope, unspecified syncope type (Primary)  -     Ambulatory Referral to Cardiology             Follow Up     No follow-ups on file.  Patient was given instructions and counseling regarding his condition or for health maintenance advice. Please see specific information pulled into the AVS if appropriate.       Patient or patient representative verbalized consent for the use of Ambient Listening during the visit with  TAYLA Ledezma for chart documentation. 2/19/2025  10:39 EST

## 2025-03-12 ENCOUNTER — HOSPITAL ENCOUNTER (OUTPATIENT)
Dept: GENERAL RADIOLOGY | Facility: HOSPITAL | Age: 75
Discharge: HOME | End: 2025-03-12
Admitting: NURSE PRACTITIONER
Payer: MEDICARE

## 2025-03-12 ENCOUNTER — OFFICE VISIT (OUTPATIENT)
Dept: FAMILY MEDICINE CLINIC | Facility: CLINIC | Age: 75
End: 2025-03-12
Payer: MEDICARE

## 2025-03-12 VITALS
WEIGHT: 161 LBS | RESPIRATION RATE: 14 BRPM | TEMPERATURE: 97.3 F | OXYGEN SATURATION: 93 % | BODY MASS INDEX: 23.05 KG/M2 | HEIGHT: 70 IN | DIASTOLIC BLOOD PRESSURE: 73 MMHG | HEART RATE: 71 BPM | SYSTOLIC BLOOD PRESSURE: 153 MMHG

## 2025-03-12 DIAGNOSIS — R05.8 POST-VIRAL COUGH SYNDROME: ICD-10-CM

## 2025-03-12 DIAGNOSIS — R06.2 WHEEZING: Primary | ICD-10-CM

## 2025-03-12 DIAGNOSIS — R05.1 ACUTE COUGH: ICD-10-CM

## 2025-03-12 LAB
EXPIRATION DATE: NORMAL
FLUAV AG UPPER RESP QL IA.RAPID: NOT DETECTED
FLUBV AG UPPER RESP QL IA.RAPID: NOT DETECTED
INTERNAL CONTROL: NORMAL
Lab: NORMAL
SARS-COV-2 AG UPPER RESP QL IA.RAPID: NOT DETECTED

## 2025-03-12 PROCEDURE — 87428 SARSCOV & INF VIR A&B AG IA: CPT | Performed by: NURSE PRACTITIONER

## 2025-03-12 PROCEDURE — 99214 OFFICE O/P EST MOD 30 MIN: CPT | Performed by: NURSE PRACTITIONER

## 2025-03-12 PROCEDURE — 1126F AMNT PAIN NOTED NONE PRSNT: CPT | Performed by: NURSE PRACTITIONER

## 2025-03-12 PROCEDURE — 71046 X-RAY EXAM CHEST 2 VIEWS: CPT

## 2025-03-12 RX ORDER — TRIAMCINOLONE ACETONIDE 1 MG/G
OINTMENT TOPICAL
COMMUNITY
Start: 2025-01-08

## 2025-03-12 RX ORDER — ALBUTEROL SULFATE 90 UG/1
2 INHALANT RESPIRATORY (INHALATION)
Qty: 6.7 G | Refills: 0 | Status: SHIPPED | OUTPATIENT
Start: 2025-03-12

## 2025-03-12 RX ORDER — DEXTROMETHORPHAN HYDROBROMIDE AND PROMETHAZINE HYDROCHLORIDE 15; 6.25 MG/5ML; MG/5ML
5 SYRUP ORAL 4 TIMES DAILY PRN
Qty: 240 ML | Refills: 1 | Status: SHIPPED | OUTPATIENT
Start: 2025-03-12

## 2025-03-12 RX ORDER — PREDNISONE 20 MG/1
20 TABLET ORAL DAILY
Qty: 7 TABLET | Refills: 0 | Status: SHIPPED | OUTPATIENT
Start: 2025-03-12 | End: 2025-03-19

## 2025-03-12 NOTE — PROGRESS NOTES
"Chief Complaint  Cough and sinus pressure    Subjective        Matthew Serrano presents to [facility]  History of Present Illness  Pleasant gentleman here today, he has had quite a bit of cough congestion of the last several months normally he does not get sick too often but several times at least the last 3 months he will get cough congestion, and will last for several weeks and finally resolved, he appears a wellness and then recurrent at least 3 separate instances of cough congestion symptoms of viral infections excetra.  Recently on a cruise in a couple weeks ago developed new cough congestion,  No initial body aches fever chest pain he said no shortness of breath, but still has a cough and congestion and he is thinking that he has been getting sick too much  Last few months and just here for checkup to make sure his chest is okay  He has no night sweats unexplained weight loss no other worrisome complaints,  No history of lung disease no history of asthma he did get bronchitis a lot as a child and this resolved,  He has no history of malignancy or other worry, he does not smoke.  Generally good health,    Cough      Objective   Vital Signs:  /73   Pulse 71   Temp 97.3 °F (36.3 °C) (Infrared)   Resp 14   Ht 177.8 cm (70\")   Wt 73 kg (161 lb)   SpO2 93%   BMI 23.10 kg/m²   Estimated body mass index is 23.1 kg/m² as calculated from the following:    Height as of this encounter: 177.8 cm (70\").    Weight as of this encounter: 73 kg (161 lb).    BMI is within normal parameters. No other follow-up for BMI required.      Physical Exam  Vitals reviewed.   Constitutional:       Appearance: Normal appearance. He is well-developed. He is not ill-appearing or diaphoretic.   HENT:      Head: Normocephalic.      Ears:      Comments: Turbinates congested     Nose: Congestion present.   Eyes:      General: No scleral icterus.     Conjunctiva/sclera: Conjunctivae normal.      Pupils: " Pupils are equal, round, and reactive to light.   Neck:      Thyroid: No thyromegaly.      Vascular: No JVD.   Cardiovascular:      Rate and Rhythm: Normal rate and regular rhythm.      Heart sounds: Normal heart sounds. No murmur heard.     No friction rub. No gallop.   Pulmonary:      Effort: Pulmonary effort is normal. No respiratory distress.      Breath sounds: Normal breath sounds. No stridor. No wheezing or rales.      Comments: Clear unlabored  Abdominal:      Comments: No hepatosplenomegaly, no ascites,   Musculoskeletal:         General: No tenderness.      Cervical back: Neck supple.   Lymphadenopathy:      Cervical: No cervical adenopathy.   Skin:     General: Skin is warm and dry.      Findings: No erythema or rash.   Neurological:      Mental Status: He is alert and oriented to person, place, and time.      Deep Tendon Reflexes: Reflexes are normal and symmetric.   Psychiatric:         Behavior: Behavior normal.         Thought Content: Thought content normal.         Judgment: Judgment normal.        Result Review :                Assessment and Plan   Diagnoses and all orders for this visit:    1. Acute cough (Primary)  -     POCT SARS-CoV-2 + Flu Antigen NELL  -     XR Chest PA & Lateral    2. Post-viral cough syndrome  -     POCT SARS-CoV-2 + Flu Antigen NELL    3. Wheezing  -     POCT SARS-CoV-2 + Flu Antigen NELL    Other orders  -     predniSONE (DELTASONE) 20 MG tablet; Take 1 tablet by mouth Daily for 7 days.  Dispense: 7 tablet; Refill: 0  -     albuterol sulfate  (90 Base) MCG/ACT inhaler; Inhale 2 puffs 4 (Four) Times a Day. For wheezing until better  Dispense: 6.7 g; Refill: 0  -     promethazine-dextromethorphan (PROMETHAZINE-DM) 6.25-15 MG/5ML syrup; Take 5 mL by mouth 4 (Four) Times a Day As Needed for Cough (Caution sedation, falls possible lowest effective dose if taken during the daytime recommend 1/2 teaspoon).  Dispense: 240 mL; Refill: 1             Follow Up   No follow-ups  on file.  Patient was given instructions and counseling regarding his condition or for health maintenance advice. Please see specific information pulled into the AVS if appropriate.     Patient Instructions     Discharge instructions  Push fluids, plenty of rest,  You have bronchial inflammation and wheezing,  Narrowing of the airways due to your viral infection recently likely year with the post viral phase however unless he picked up a new virus the last few days    Prednisone 20 mg for 5 to 7 days  Albuterol 3-4 times a day until better 2 inhalations    Push fluids  Tylenol as needed for aches or pains if needed  Chest x-ray on the way out today to make sure it looks clear    Promethazine DM or other if need be for cough caution sedation lowest effective dose    Update me your progress next week  I will follow you until his cough resolves,    I need to give you the chest x-ray results as well    If you have further recurrent infection  Cough shortness of breath or not resolving symptoms you should see pulmonary, let me know please, for prompt referral    Should you have any chronic cough let me be clear you need to see pulmonary now we will update make sure this resolves over next couple weeks completely otherwise I will do a referral

## 2025-03-12 NOTE — PATIENT INSTRUCTIONS
Discharge instructions  Push fluids, plenty of rest,  You have bronchial inflammation and wheezing,  Narrowing of the airways due to your viral infection recently likely year with the post viral phase however unless he picked up a new virus the last few days    Prednisone 20 mg for 5 to 7 days  Albuterol 3-4 times a day until better 2 inhalations    Push fluids  Tylenol as needed for aches or pains if needed  Chest x-ray on the way out today to make sure it looks clear    Promethazine DM or other if need be for cough caution sedation lowest effective dose    Update me your progress next week  I will follow you until his cough resolves,    I need to give you the chest x-ray results as well    If you have further recurrent infection  Cough shortness of breath or not resolving symptoms you should see pulmonary, let me know please, for prompt referral    Should you have any chronic cough let me be clear you need to see pulmonary now we will update make sure this resolves over next couple weeks completely otherwise I will do a referral

## 2025-03-14 RX ORDER — AZITHROMYCIN 250 MG/1
TABLET, FILM COATED ORAL
Qty: 6 TABLET | Refills: 0 | Status: SHIPPED | OUTPATIENT
Start: 2025-03-14

## 2025-03-18 ENCOUNTER — OFFICE VISIT (OUTPATIENT)
Dept: CARDIOLOGY | Facility: CLINIC | Age: 75
End: 2025-03-18
Payer: MEDICARE

## 2025-03-18 VITALS
DIASTOLIC BLOOD PRESSURE: 62 MMHG | BODY MASS INDEX: 23.82 KG/M2 | WEIGHT: 166.4 LBS | SYSTOLIC BLOOD PRESSURE: 110 MMHG | HEART RATE: 51 BPM | HEIGHT: 70 IN

## 2025-03-18 DIAGNOSIS — R55 SYNCOPE AND COLLAPSE: Primary | ICD-10-CM

## 2025-03-18 PROCEDURE — 93000 ELECTROCARDIOGRAM COMPLETE: CPT | Performed by: INTERNAL MEDICINE

## 2025-03-18 PROCEDURE — 99204 OFFICE O/P NEW MOD 45 MIN: CPT | Performed by: INTERNAL MEDICINE

## 2025-03-18 PROCEDURE — 1159F MED LIST DOCD IN RCRD: CPT | Performed by: INTERNAL MEDICINE

## 2025-03-18 PROCEDURE — 1160F RVW MEDS BY RX/DR IN RCRD: CPT | Performed by: INTERNAL MEDICINE

## 2025-03-18 NOTE — PROGRESS NOTES
[facility]      Patient Name: Matthew Serrano  :1950  Age: 74 y.o.  Encounter Provider:  Jonnathan Martínez Jr, MD      Chief Complaint: Initial evaluation syncope      HPI  Matthew Serrano is a 74 y.o. male past medical history of hypogonadism who presents for evaluation of syncope.  Patient was initially seen by Dr. Tena in  for exertional dyspnea.  He had a normal stress study and echocardiogram.  In January he was on vacation in [place] and boarding a flight back to [place].  He was lifting a 30 pound suitcase and became lightheaded with brief loss of consciousness.  No sweating or nausea.  He was taken to [facility] in [place] and monitored overnight.  He received IV fluids and has not had any symptoms since then.  Blood pressure is 110/62 with a resting heart rate of 51 bpm.  He denies angina.  No orthopnea, PND or edema.  He occasionally gets palpitations but has never had a prolonged episode.  Social and family history was reviewed.  No cardiac complaints at time of interview.  EKG shows sinus rhythm with nonspecific repolarization abnormalities.      The following portions of the patient's history were reviewed and updated as appropriate: allergies, current medications, past family history, past medical history, past social history, past surgical history and problem list.      Review of Systems   Constitutional: Negative for chills and fever.   HENT:  Negative for hoarse voice and sore throat.    Eyes:  Negative for double vision and photophobia.   Cardiovascular:  Positive for palpitations and syncope. Negative for chest pain, leg swelling, near-syncope, orthopnea and paroxysmal nocturnal dyspnea.   Respiratory:  Negative for cough and wheezing.    Skin:  Negative for poor wound healing and rash.   Musculoskeletal:  Negative for arthritis and joint swelling.   Gastrointestinal:  Negative for bloating, abdominal pain, hematemesis and hematochezia.  "  Neurological:  Negative for dizziness and focal weakness.   Psychiatric/Behavioral:  Negative for depression and suicidal ideas.        OBJECTIVE:   Vital Signs  Vitals:    03/18/25 0852   BP: 110/62   Pulse: 51     Estimated body mass index is 23.88 kg/m² as calculated from the following:    Height as of this encounter: 177.8 cm (70\").    Weight as of this encounter: 75.5 kg (166 lb 6.4 oz).    Vitals reviewed.   Constitutional:       Appearance: Healthy appearance. Not in distress.   Neck:      Vascular: No JVR. JVD normal.   Pulmonary:      Effort: Pulmonary effort is normal.      Breath sounds: Normal breath sounds. No wheezing. No rhonchi. No rales.   Chest:      Chest wall: Not tender to palpatation.   Cardiovascular:      PMI at left midclavicular line. Normal rate. Regular rhythm. Normal S1. Normal S2.       Murmurs: There is no murmur.      No gallop.  No click. No rub.   Pulses:     Intact distal pulses.   Edema:     Peripheral edema absent.   Abdominal:      General: Bowel sounds are normal.      Palpations: Abdomen is soft.      Tenderness: There is no abdominal tenderness.   Musculoskeletal: Normal range of motion.         General: No tenderness. Skin:     General: Skin is warm and dry.   Neurological:      General: No focal deficit present.      Mental Status: Alert and oriented to person, place and time.           ECG 12 Lead    Date/Time: 3/18/2025 8:58 AM  Performed by: Jonnathan Martínez Jr., MD    Authorized by: Jonnathan Martínez Jr., MD  Comparison: compared with previous ECG from 4/6/2022  Similar to previous ECG  Rhythm: sinus bradycardia  Other findings: non-specific ST-T wave changes    Clinical impression: non-specific ECG        Lipid Panel          1/9/2025    10:32   Lipid Panel   Total Cholesterol 136    Triglycerides 58    HDL Cholesterol 49    VLDL Cholesterol 12    LDL Cholesterol  75    LDL/HDL Ratio 1.5         BUN   Date Value Ref Range Status   01/09/2025 12 8 - 27 mg/dL Final "   01/19/2024 15 8 - 23 mg/dL Final     Creatinine   Date Value Ref Range Status   01/09/2025 1.19 0.76 - 1.27 mg/dL Final   01/19/2024 0.94 0.76 - 1.27 mg/dL Final   12/21/2020 1.00 0.60 - 1.30 mg/dL Final     Comment:     Serial Number: [id number]:  [id number]     Potassium   Date Value Ref Range Status   01/09/2025 4.8 3.5 - 5.2 mmol/L Final   01/19/2024 4.8 3.5 - 5.2 mmol/L Final     ALT (SGPT)   Date Value Ref Range Status   01/09/2025 15 0 - 44 IU/L Final   01/19/2024 13 1 - 41 U/L Final     AST (SGOT)   Date Value Ref Range Status   01/09/2025 17 0 - 40 IU/L Final   01/19/2024 20 1 - 40 U/L Final           ASSESSMENT:     74-year-old male without much in the way of past medical history presents for evaluation of syncope      PLAN OF CARE:     Syncope -after receiving IV fluids patient has had no further symptoms.  Etiology is uncertain..  Normal echocardiogram on 2/3/2025.  I am going to place the patient on a Holter monitor for 7 days.  Follow diagnostic testing results for further treatment recommendations.     Return to clinic 6 months             Discharge Medications            Accurate as of March 18, 2025  8:58 AM. If you have any questions, ask your nurse or doctor.                Continue These Medications        Instructions Start Date   albuterol sulfate  (90 Base) MCG/ACT inhaler  Commonly known as: PROVENTIL HFA;VENTOLIN HFA;PROAIR HFA   2 puffs, Inhalation, 4 Times Daily - RT, For wheezing until better      azithromycin 250 MG tablet  Commonly known as: Zithromax Z-Daniel   Take 2 tablets the first day, then 1 tablet daily for 4 days.  For mucous plugging and possible small infiltrate or pneumonia on recent chest x-ray      Glucosamine-Chondroitin 750-600 MG tablet   Take  by mouth.      MULTI COMPLETE PO   Take  by mouth.      predniSONE 20 MG tablet  Commonly known as: DELTASONE   20 mg, Oral, Daily      promethazine-dextromethorphan 6.25-15 MG/5ML syrup  Commonly known as:  PROMETHAZINE-DM   5 mL, Oral, 4 Times Daily PRN      sildenafil 20 MG tablet  Commonly known as: REVATIO   TAKE ONE TO FIVE TABLETS BY MOUTH AS NEEDED PRIOR TO INTERCOURSE - MAX DAILY DOSE OF 5 TABLETS IN 24 HOURS      triamcinolone 0.1 % ointment  Commonly known as: KENALOG       vitamin B-12 1000 MCG tablet  Commonly known as: CYANOCOBALAMIN   1,000 mcg, Daily      vitamin D3 125 MCG (5000 UT) capsule capsule   5,000 Units, Daily               Thank you for allowing me to participate in the care of your patient,      Sincerely,   Jonanthan Martínez MD  [facility]  03/18/25  08:58 EDT

## 2025-03-20 ENCOUNTER — TELEPHONE (OUTPATIENT)
Dept: FAMILY MEDICINE CLINIC | Facility: CLINIC | Age: 75
End: 2025-03-20

## 2025-03-20 NOTE — TELEPHONE ENCOUNTER
Caller: Berna Serrano    Relationship: Emergency Contact    Best call back number:  PATIENTS NUMBER    What medication are you requesting:     What are your current symptoms: PRODUCTIVE COUGH RUNNY NOSE    How long have you been experiencing symptoms:  SINCE VISIT ON 03/12/25    Have you had these symptoms before:    [x] Yes  [] No    Have you been treated for these symptoms before:   [x] Yes  [] No    If a prescription is needed, what is your preferred pharmacy and phone number: [facility] [id number] [address] [facility]  AT [address] [phone] [facility] [phone]      Additional notes: PATIENTS WIFE BERNA SAYS THAT THE PATIENT IS STILL FEELING BAD WITH THE SAME SYMPTOMS HE WAS SEEN IN THE OFFICE FOR ON MARCH 12, 2025 AND SHE WANTS TO KNOW IF DR FERNANDEZ WILL CALL HIM IN A DIFFERENT MEDICATION TO HELP WITH HIS SYMPTOMS LISTED ABOVE.

## 2025-03-21 DIAGNOSIS — R05.1 ACUTE COUGH: Primary | ICD-10-CM

## 2025-03-21 DIAGNOSIS — T17.998A MUCUS PLUG IN RESPIRATORY TRACT: ICD-10-CM

## 2025-03-21 DIAGNOSIS — R06.2 WHEEZING: ICD-10-CM

## 2025-03-21 DIAGNOSIS — R93.89 ABNORMAL CXR: ICD-10-CM

## 2025-03-24 NOTE — TELEPHONE ENCOUNTER
Spoke with PT. PT is feeling better and does not want to proceed with ct and request abx be cancelled.